# Patient Record
Sex: FEMALE | Race: WHITE | Employment: OTHER | URBAN - METROPOLITAN AREA
[De-identification: names, ages, dates, MRNs, and addresses within clinical notes are randomized per-mention and may not be internally consistent; named-entity substitution may affect disease eponyms.]

---

## 2017-08-10 LAB — AMB DEXA, EXTERNAL: NORMAL

## 2017-11-02 ENCOUNTER — OFFICE VISIT (OUTPATIENT)
Dept: FAMILY MEDICINE CLINIC | Age: 82
End: 2017-11-02

## 2017-11-02 VITALS
RESPIRATION RATE: 18 BRPM | SYSTOLIC BLOOD PRESSURE: 135 MMHG | WEIGHT: 114.8 LBS | BODY MASS INDEX: 21.67 KG/M2 | HEIGHT: 61 IN | HEART RATE: 74 BPM | DIASTOLIC BLOOD PRESSURE: 55 MMHG | TEMPERATURE: 97.7 F

## 2017-11-02 DIAGNOSIS — Z13.5 SCREENING FOR GLAUCOMA: ICD-10-CM

## 2017-11-02 DIAGNOSIS — R63.4 WEIGHT LOSS: Primary | ICD-10-CM

## 2017-11-02 PROBLEM — M81.0 AGE-RELATED OSTEOPOROSIS WITHOUT CURRENT PATHOLOGICAL FRACTURE: Status: ACTIVE | Noted: 2017-11-02

## 2017-11-02 NOTE — PROGRESS NOTES
Subjective:  Sonja Correia is 80y.o. year old female who presents today for establishment of care. Her daughter, who is with her, is concerned because she has lost weight. Patient Active Problem List   Diagnosis Code    Osteoarthritis M19.90    Age-related osteoporosis without current pathological fracture M81.0     Past Medical History:   Diagnosis Date    Joint pain     Osteoarthritis     Osteoporosis      History reviewed. No pertinent surgical history. Family History   Problem Relation Age of Onset    Hypertension Mother     Stroke Father     Lung Disease Brother     Cancer Brother      lung    Crohn's Disease Daughter     Thyroid Disease Daughter     Diabetes Son      Social History   Substance Use Topics    Smoking status: Never Smoker    Smokeless tobacco: Never Used    Alcohol use No     Allergies   Allergen Reactions    Codeine Nausea Only     Current Outpatient Prescriptions   Medication Sig Dispense Refill    denosumab (PROLIA) 60 mg/mL injection 60 mg by SubCUTAneous route. Objective:  Visit Vitals    /55    Pulse 74    Temp 97.7 °F (36.5 °C) (Oral)    Resp 18    Ht 5' 0.53\" (1.537 m)    Wt 114 lb 12.8 oz (52.1 kg)    BMI 22.03 kg/m2     General appearance - alert, well appearing, and in no distress  Mental status - alert, oriented to person, place, and time, normal mood, behavior, speech, dress, motor activity, and thought processes  Chest - clear to auscultation, no wheezes, rales or rhonchi, symmetric air entry   Heart - normal rate, regular rhythm, normal S1, S2, no murmurs, rubs, clicks or gallops   Ext-no pedal edema noted      Assessment/Plan:    ICD-10-CM ICD-9-CM    1. Weight loss R63.4 783.21    2. Screening for glaucoma Z13.5 V80.1 REFERRAL TO OPHTHALMOLOGY       Will follow weight  Eye exam    Follow-up Disposition:  Return in about 6 weeks (around 12/14/2017) for weight loss. Reviewed plan of care.   Patient has provided input and agrees with goals.

## 2017-11-02 NOTE — MR AVS SNAPSHOT
Visit Information Date & Time Provider Department Dept. Phone Encounter #  
 11/2/2017 10:00 AM Oscar Mckinnonloraine 34 [id number] Upcoming Health Maintenance Date Due DTaP/Tdap/Td series (1 - Tdap) 9/16/1952 ZOSTER VACCINE AGE 60> 7/16/1991 GLAUCOMA SCREENING Q2Y 9/16/1996 MEDICARE YEARLY EXAM 9/16/1996 Pneumococcal 65+ Low/Medium Risk (2 of 2 - PPSV23) 3/17/2017 Allergies as of 11/2/2017  Review Complete On: 11/2/2017 By: Freddy Moraes MD  
  
 Severity Noted Reaction Type Reactions Codeine  11/02/2017    Nausea Only Current Immunizations  Reviewed on 11/2/2017 Name Date DTaP 5/15/2012 Influenza Vaccine 10/15/2017 Reviewed by Flaquito Reyes LPN on 62/0/4113 at 24:88 AM  
You Were Diagnosed With   
  
 Codes Comments Weight loss    -  Primary ICD-10-CM: R63.4 ICD-9-CM: 783.21 Screening for glaucoma     ICD-10-CM: Z13.5 ICD-9-CM: V80.1 Vitals BP Pulse Temp Resp Height(growth percentile) Weight(growth percentile) 135/55 74 97.7 °F (36.5 °C) (Oral) 18 5' 0.53\" (1.537 m) 114 lb 12.8 oz (52.1 kg) BMI OB Status Smoking Status 22.03 kg/m2 Postmenopausal Never Smoker BMI and BSA Data Body Mass Index Body Surface Area 22.03 kg/m 2 1.49 m 2 Your Updated Medication List  
  
   
This list is accurate as of: 11/2/17 11:05 AM.  Always use your most recent med list.  
  
  
  
  
 PROLIA 60 mg/mL injection Generic drug:  denosumab 60 mg by SubCUTAneous route. We Performed the Following REFERRAL TO OPHTHALMOLOGY [REF57 Custom] Comments:  
 Screening for glaucoma Referral Information Referral ID Referred By Referred To  
  
 [id number] Erin Del Rio [facility] [address] [place], [address] Visits Status Start Date End Date 1 New Request 11/2/17 11/2/18 If your referral has a status of pending review or denied, additional information will be sent to support the outcome of this decision. Introducing [facility]! [facility] introduces [employer] patient portal. Now you can access parts of your medical record, email your doctor's office, and request medication refills online. 1. In your internet browser, go to [email or web address]://[email or web address]. [employer] 2. Click on the First Time User? Click Here link in the Sign In box. You will see the New Member Sign Up page. 3. Enter your [employer] Access Code exactly as it appears below. You will not need to use this code after youve completed the sign-up process. If you do not sign up before the expiration date, you must request a new code. · [employer] Access Code: [email or web address] Expires: 1/31/2018 10:05 AM 
 
4. Enter the last four digits of your Social Security Number (xxxx) and Date of Birth (mm/dd/yyyy) as indicated and click Submit. You will be taken to the next sign-up page. 5. Create a [employer] ID. This will be your [employer] login ID and cannot be changed, so think of one that is secure and easy to remember. 6. Create a [employer] password. You can change your password at any time. 7. Enter your Password Reset Question and Answer. This can be used at a later time if you forget your password. 8. Enter your e-mail address. You will receive e-mail notification when new information is available in [address] 9. Click Sign Up. You can now view and download portions of your medical record. 10. Click the Download Summary menu link to download a portable copy of your medical information. If you have questions, please visit the Frequently Asked Questions section of the [employer] website. Remember, [employer] is NOT to be used for urgent needs. For medical emergencies, dial 911. Now available from your iPhone and Android! Please provide this summary of care documentation to your next provider. Your primary care clinician is listed as Barney Archibald. If you have any questions after today's visit, please call [phone].

## 2017-12-14 ENCOUNTER — TELEPHONE (OUTPATIENT)
Dept: FAMILY MEDICINE CLINIC | Age: 82
End: 2017-12-14

## 2017-12-14 ENCOUNTER — OFFICE VISIT (OUTPATIENT)
Dept: FAMILY MEDICINE CLINIC | Age: 82
End: 2017-12-14

## 2017-12-14 VITALS
HEIGHT: 60 IN | SYSTOLIC BLOOD PRESSURE: 105 MMHG | HEART RATE: 87 BPM | BODY MASS INDEX: 22.58 KG/M2 | WEIGHT: 115 LBS | DIASTOLIC BLOOD PRESSURE: 56 MMHG | RESPIRATION RATE: 18 BRPM | TEMPERATURE: 97.6 F

## 2017-12-14 DIAGNOSIS — Z13.220 SCREENING CHOLESTEROL LEVEL: ICD-10-CM

## 2017-12-14 DIAGNOSIS — R63.4 UNEXPLAINED WEIGHT LOSS: Primary | ICD-10-CM

## 2017-12-14 NOTE — MR AVS SNAPSHOT
Visit Information Date & Time Provider Department Dept. Phone Encounter #  
 12/14/2017  9:45 AM Rachael McdonaldVesta 34 [id number] Follow-up Instructions Return in about 3 months (around 3/14/2018) for Medicare wellness visit. Your Appointments 3/22/2018  8:15 AM  
Medicare Physical with Rachael Mcdonald MD  
[address] [facility]) Appt Note: Medicare Wellness [address] [address]  
  
   
 [address] [place] [address] [address] [address] Upcoming Health Maintenance Date Due ZOSTER VACCINE AGE 60> 7/16/1991 GLAUCOMA SCREENING Q2Y 9/16/1996 MEDICARE YEARLY EXAM 9/16/1996 Pneumococcal 65+ Low/Medium Risk (2 of 2 - PPSV23) 3/17/2017 DTaP/Tdap/Td series (2 - Td) 5/15/2022 Allergies as of 12/14/2017  Review Complete On: 12/14/2017 By: Rachael Mcdonald MD  
  
 Severity Noted Reaction Type Reactions Codeine  11/02/2017    Nausea Only Current Immunizations  Reviewed on 11/2/2017 Name Date DTaP 5/15/2012 Influenza Vaccine 10/15/2017 Not reviewed this visit You Were Diagnosed With   
  
 Codes Comments Unexplained weight loss    -  Primary ICD-10-CM: R63.4 ICD-9-CM: 783.21 Screening cholesterol level     ICD-10-CM: C60.232 ICD-9-CM: V77.91 Vitals BP Pulse Temp Resp Height(growth percentile) Weight(growth percentile) 105/56 87 97.6 °F (36.4 °C) (Oral) 18 5' (1.524 m) 115 lb (52.2 kg) BMI OB Status Smoking Status 22.46 kg/m2 Postmenopausal Never Smoker Vitals History BMI and BSA Data Body Mass Index Body Surface Area  
 22.46 kg/m 2 1.49 m 2 Preferred Pharmacy Pharmacy Name Phone [id number] [place], [address] AT [address] [phone] Your Updated Medication List  
  
   
 This list is accurate as of: 12/14/17  1:15 PM.  Always use your most recent med list.  
  
  
  
  
 CALCIUM 600 + D 600-125 mg-unit Tab Generic drug:  calcium-cholecalciferol (d3) Take  by mouth. PROLIA 60 mg/mL injection Generic drug:  denosumab 60 mg by SubCUTAneous route. We Performed the Following CBC WITH AUTOMATED DIFF [71336 CPT(R)] LIPID PANEL [78036 CPT(R)] METABOLIC PANEL, COMPREHENSIVE [45335 CPT(R)] TSH 3RD GENERATION [17777 CPT(R)] Follow-up Instructions Return in about 3 months (around 3/14/2018) for Medicare wellness visit. Introducing [facility] HE[facility] SERVICES! Romayne Duster introduces [employer] patient portal. Now you can access parts of your medical record, email your doctor's office, and request medication refills online. 1. In your internet browser, go to [email or web address] 2. Click on the First Time User? Click Here link in the Sign In box. You will see the New Member Sign Up page. 3. Enter your [employer] Access Code exactly as it appears below. You will not need to use this code after youve completed the sign-up process. If you do not sign up before the expiration date, you must request a new code. · [employer] Access Code: [email or web address] Expires: 1/31/2018  9:05 AM 
 
4. Enter the last four digits of your Social Security Number (xxxx) and Date of Birth (mm/dd/yyyy) as indicated and click Submit. You will be taken to the next sign-up page. 5. Create a [employer] ID. This will be your [employer] login ID and cannot be changed, so think of one that is secure and easy to remember. 6. Create a [employer] password. You can change your password at any time. 7. Enter your Password Reset Question and Answer. This can be used at a later time if you forget your password. 8. Enter your e-mail address. You will receive e-mail notification when new information is available in [address] 9. Click Sign Up. You can now view and download portions of your medical record. 10. Click the Download Summary menu link to download a portable copy of your medical information. If you have questions, please visit the Frequently Asked Questions section of the [employer] website. Remember, [employer] is NOT to be used for urgent needs. For medical emergencies, dial 911. Now available from your iPhone and Android! Please provide this summary of care documentation to your next provider. Your primary care clinician is listed as Naima Baca. If you have any questions after today's visit, please call [phone].

## 2017-12-14 NOTE — PROGRESS NOTES
HISTORY OF PRESENT ILLNESS  Shabnam Hernandez is a 80 y.o. female. Weight Loss   The history is provided by the patient (her daughter is with her today. Her weight has stabilzed. She has been eating more ice cream.). This is a chronic problem. Episode onset: about 1 1/2 years ago. The problem has been resolved. Pertinent negatives include no chest pain, no abdominal pain, no headaches and no shortness of breath. Nothing aggravates the symptoms. Relieved by: increasing caloric intake. She has tried nothing for the symptoms. Review of Systems   Constitutional: Negative for chills, fever, malaise/fatigue and weight loss. Eyes: Negative for blurred vision. Respiratory: Negative for cough, shortness of breath and wheezing. Cardiovascular: Negative for chest pain and leg swelling. Gastrointestinal: Negative for abdominal pain, blood in stool, constipation, diarrhea, heartburn, melena, nausea and vomiting. Genitourinary: Negative for hematuria. No polyuria   Musculoskeletal:        Right hip pain due to osteoarthritis     Skin: Negative for rash. Neurological: Negative for headaches. Endo/Heme/Allergies: Negative for polydipsia. Psychiatric/Behavioral: Negative for depression. She enjoys life       Visit Vitals    /56    Pulse 87    Temp 97.6 °F (36.4 °C) (Oral)    Resp 18    Ht 5' (1.524 m)    Wt 115 lb (52.2 kg)    BMI 22.46 kg/m2     BP Readings from Last 3 Encounters:   12/14/17 105/56   11/02/17 135/55     Physical Exam   Constitutional: She is oriented to person, place, and time. She appears well-developed and well-nourished. No distress. Cardiovascular: Normal rate, regular rhythm and normal heart sounds. Exam reveals no gallop and no friction rub. No murmur heard. Pulmonary/Chest: Effort normal and breath sounds normal. No respiratory distress. She has no wheezes. She has no rales. Abdominal: Soft.  Normal appearance and bowel sounds are normal. She exhibits no distension. There is no hepatosplenomegaly. There is no tenderness. There is no rebound and no guarding. Musculoskeletal: She exhibits no edema. Neurological: She is alert and oriented to person, place, and time. Skin: Skin is warm and dry. She is not diaphoretic. Psychiatric: She has a normal mood and affect. Her behavior is normal. Judgment and thought content normal.   Nursing note and vitals reviewed. ASSESSMENT and PLAN    ICD-10-CM ICD-9-CM    1. Unexplained weight loss T45.9 172.77 METABOLIC PANEL, COMPREHENSIVE      CBC WITH AUTOMATED DIFF      TSH 3RD GENERATION   2. Screening cholesterol level Z13.220 V77.91 LIPID PANEL        Weight has stabilized  Labs per orders. Follow-up Disposition:  Return in about 3 months (around 3/14/2018) for Medicare wellness visit. Reviewed plan of care. Patient has provided input and agrees with goals.

## 2017-12-14 NOTE — TELEPHONE ENCOUNTER
----- Message from Caridad Griffin MD sent at 12/14/2017 10:38 AM EST -----  Please get her bone density result from [facility]. Please leave it on my chair. Call the patient and her daughter when it is received.

## 2017-12-14 NOTE — PROGRESS NOTES
Chief Complaint   Patient presents with    Weight Loss     6 wk f/u and discuss bone density report     1. Have you been to the ER, urgent care clinic since your last visit? No  Hospitalized since your last visit? No    2. Have you seen or consulted any other health care providers outside of the [address] [place] [address] since your last visit? Include any pap smears or colon screening.  No

## 2017-12-14 NOTE — TELEPHONE ENCOUNTER
----- Message from Aminta Delgado sent at 12/14/2017 12:21 PM EST -----  Regarding: Dr. Krunal Wong, daughter, is wonder if paperwork will be mailed to her for the Medicare Wellness Visit in March as it was not given to her while at the practice this morning    Best contact  [phone]

## 2017-12-14 NOTE — TELEPHONE ENCOUNTER
Pt's daughter requested call back to advise when pt's records from Dr. Marcia Villalobos are located, especially pt's bone density results from 8/10/17, stating release was signed and she confirmed with office records were already sent to Dr. Hortencia Parnell. Nurse located bone density report in [facility] and Dr. Hortencia Parnell can review. Pt's daughter advised and can be reached at [phone] if anything further is needed after results are reviewed.  Fei

## 2017-12-15 ENCOUNTER — TELEPHONE (OUTPATIENT)
Dept: FAMILY MEDICINE CLINIC | Age: 82
End: 2017-12-15

## 2017-12-15 NOTE — TELEPHONE ENCOUNTER
Called pt's daughter and left a voice message advising the following. Advised I was not sure what type of paperwork she was needing in regard to her AWV. Advised patients, normally, receive an after visit summary at the end of her appointment and that has pt's appointment summary and any additional information provider recommends for pt. Advised Dr. Alejandrina Palomares has not, yet, reviewed pt's records however she does normally review over the weekend. Advised we will be faxing over request, for Prolia  injection, today, and should receive a call today or he latest Monday to schedule. Advised to call back with questions or concerns.

## 2017-12-15 NOTE — TELEPHONE ENCOUNTER
Pts daughter, Deshawn Ponce returning office call to discuss paperwork for Medicare Wellness visit in March. She is also asking for Dr Cele Madrigal to review her records and order her next Prolia injection which is now due. She can be reached at [phone] but she is at work and said that you can leave a message concerning this.

## 2017-12-15 NOTE — TELEPHONE ENCOUNTER
Called pt's daughter, and left a voice message, asking that she call the office back in regard to her question/concern.

## 2017-12-26 ENCOUNTER — TELEPHONE (OUTPATIENT)
Dept: FAMILY MEDICINE CLINIC | Age: 82
End: 2017-12-26

## 2017-12-26 NOTE — PROGRESS NOTES
Called Dr Emre Casas office ([phone]) and spoke with Dr Omer Tolentino, re: for order for Prolia shot. As per Dr Kai Cazares order was already faxed over. Order was not in  media. [address] [place] [address] with Meena Osorio who confirmed order for medication, and indicated that order will be scanned in media today.

## 2017-12-26 NOTE — TELEPHONE ENCOUNTER
Phone call with Ms. Jc Timmons NP at the infusion center. Evidently the patient is coming in tomorrow for Prolia, but they have no order. Advised her that it was sent 12/15 and that Jonnie Saba said they had received it. She will check with La and get back with me if a new order is needed.

## 2017-12-27 ENCOUNTER — HOSPITAL ENCOUNTER (OUTPATIENT)
Dept: INFUSION THERAPY | Age: 82
Discharge: HOME | End: 2017-12-27
Payer: MEDICARE

## 2017-12-27 VITALS
TEMPERATURE: 97 F | HEART RATE: 69 BPM | RESPIRATION RATE: 18 BRPM | SYSTOLIC BLOOD PRESSURE: 130 MMHG | DIASTOLIC BLOOD PRESSURE: 61 MMHG

## 2017-12-27 LAB
ANION GAP BLD CALC-SCNC: 15 MMOL/L (ref 5–15)
BUN BLD-MCNC: 18 MG/DL (ref 9–20)
CA-I BLD-MCNC: 1.16 MMOL/L (ref 1.12–1.32)
CHLORIDE BLD-SCNC: 103 MMOL/L (ref 98–107)
CO2 BLD-SCNC: 28 MMOL/L (ref 21–32)
CREAT BLD-MCNC: 0.7 MG/DL (ref 0.6–1.3)
GLUCOSE BLD-MCNC: 151 MG/DL (ref 65–100)
HCT VFR BLD CALC: 35 % (ref 35–47)
HGB BLD-MCNC: 11.9 GM/DL (ref 11.5–16)
MAGNESIUM SERPL-MCNC: 2.1 MG/DL (ref 1.6–2.4)
PHOSPHATE SERPL-MCNC: 3.1 MG/DL (ref 2.6–4.7)
POTASSIUM BLD-SCNC: 3.9 MMOL/L (ref 3.5–5.1)
SERVICE CMNT-IMP: ABNORMAL
SODIUM BLD-SCNC: 141 MMOL/L (ref 136–145)

## 2017-12-27 PROCEDURE — 74011250636 HC RX REV CODE- 250/636: Performed by: FAMILY MEDICINE

## 2017-12-27 PROCEDURE — 80047 BASIC METABLC PNL IONIZED CA: CPT

## 2017-12-27 PROCEDURE — 84100 ASSAY OF PHOSPHORUS: CPT | Performed by: FAMILY MEDICINE

## 2017-12-27 PROCEDURE — 36415 COLL VENOUS BLD VENIPUNCTURE: CPT | Performed by: FAMILY MEDICINE

## 2017-12-27 PROCEDURE — 96372 THER/PROPH/DIAG INJ SC/IM: CPT

## 2017-12-27 PROCEDURE — 83735 ASSAY OF MAGNESIUM: CPT | Performed by: FAMILY MEDICINE

## 2017-12-27 RX ADMIN — DENOSUMAB 60 MG: 60 INJECTION SUBCUTANEOUS at 15:20

## 2017-12-27 NOTE — PROGRESS NOTES
Outpatient [facility]fusion [facility] Nursing Progress Note:    0076  Patient arrived ambulatory, accompanied by family, for scheduled Prolia injection. PT has had this previously and understands what to expect. I reinforced the need to take calcium and vitamin D to prevent hypocalcemia and to avoid invasive dental procedures/rationale. Reminded to report to physician any new dull or aching hip or thigh pain, incapacitating or severe bone, joint, and/or muscle pain, or persistent jaw or mouth pain and/or a nonhealing mouth or jaw sore. Creatinine 0.7 and icalcium 1.16 . Blood pressure 130/61, pulse 69, temperature 97 °F (36.1 °C), resp. rate 18. Pt tolerated injection well; has next prolia appointment 6/27/17 @ 1400.    1525 pt left ambulatory,  in no distress.

## 2017-12-27 NOTE — PROGRESS NOTES
Problem: Knowledge Deficit  Goal: *Verbalizes understanding of procedures and medications  Outcome: Progressing Towards Goal  PT arrived ambulatory and in no distress for Prolia.

## 2018-01-06 LAB
ALBUMIN SERPL-MCNC: 4.3 G/DL (ref 3.5–4.7)
ALBUMIN/GLOB SERPL: 1.8 {RATIO} (ref 1.2–2.2)
ALP SERPL-CCNC: 48 IU/L (ref 39–117)
ALT SERPL-CCNC: 7 IU/L (ref 0–32)
AST SERPL-CCNC: 15 IU/L (ref 0–40)
BASOPHILS # BLD AUTO: 0 X10E3/UL (ref 0–0.2)
BASOPHILS NFR BLD AUTO: 0 %
BILIRUB SERPL-MCNC: 0.5 MG/DL (ref 0–1.2)
BUN SERPL-MCNC: 15 MG/DL (ref 8–27)
BUN/CREAT SERPL: 19 (ref 12–28)
CALCIUM SERPL-MCNC: 8.9 MG/DL (ref 8.7–10.3)
CHLORIDE SERPL-SCNC: 104 MMOL/L (ref 96–106)
CHOLEST SERPL-MCNC: 242 MG/DL (ref 100–199)
CO2 SERPL-SCNC: 25 MMOL/L (ref 18–29)
CREAT SERPL-MCNC: 0.81 MG/DL (ref 0.57–1)
EOSINOPHIL # BLD AUTO: 0.1 X10E3/UL (ref 0–0.4)
EOSINOPHIL NFR BLD AUTO: 1 %
ERYTHROCYTE [DISTWIDTH] IN BLOOD BY AUTOMATED COUNT: 13.9 % (ref 12.3–15.4)
GLOBULIN SER CALC-MCNC: 2.4 G/DL (ref 1.5–4.5)
GLUCOSE SERPL-MCNC: 96 MG/DL (ref 65–99)
HCT VFR BLD AUTO: 43.9 % (ref 34–46.6)
HDLC SERPL-MCNC: 49 MG/DL
HGB BLD-MCNC: 14.4 G/DL (ref 11.1–15.9)
IMM GRANULOCYTES # BLD: 0 X10E3/UL (ref 0–0.1)
IMM GRANULOCYTES NFR BLD: 0 %
INTERPRETATION, 910389: NORMAL
LDLC SERPL CALC-MCNC: 161 MG/DL (ref 0–99)
LYMPHOCYTES # BLD AUTO: 1.5 X10E3/UL (ref 0.7–3.1)
LYMPHOCYTES NFR BLD AUTO: 30 %
MCH RBC QN AUTO: 30.8 PG (ref 26.6–33)
MCHC RBC AUTO-ENTMCNC: 32.8 G/DL (ref 31.5–35.7)
MCV RBC AUTO: 94 FL (ref 79–97)
MONOCYTES # BLD AUTO: 0.3 X10E3/UL (ref 0.1–0.9)
MONOCYTES NFR BLD AUTO: 6 %
NEUTROPHILS # BLD AUTO: 3.2 X10E3/UL (ref 1.4–7)
NEUTROPHILS NFR BLD AUTO: 63 %
PLATELET # BLD AUTO: 226 X10E3/UL (ref 150–379)
POTASSIUM SERPL-SCNC: 4.5 MMOL/L (ref 3.5–5.2)
PROT SERPL-MCNC: 6.7 G/DL (ref 6–8.5)
RBC # BLD AUTO: 4.68 X10E6/UL (ref 3.77–5.28)
SODIUM SERPL-SCNC: 146 MMOL/L (ref 134–144)
TRIGL SERPL-MCNC: 160 MG/DL (ref 0–149)
TSH SERPL DL<=0.005 MIU/L-ACNC: 7.62 UIU/ML (ref 0.45–4.5)
VLDLC SERPL CALC-MCNC: 32 MG/DL (ref 5–40)
WBC # BLD AUTO: 5 X10E3/UL (ref 3.4–10.8)

## 2018-01-07 DIAGNOSIS — R73.9 ELEVATED BLOOD SUGAR: Primary | ICD-10-CM

## 2018-01-24 LAB
EST. AVERAGE GLUCOSE BLD GHB EST-MCNC: 105 MG/DL
HBA1C MFR BLD: 5.3 % (ref 4.8–5.6)

## 2018-03-22 ENCOUNTER — OFFICE VISIT (OUTPATIENT)
Dept: FAMILY MEDICINE CLINIC | Age: 83
End: 2018-03-22

## 2018-03-22 VITALS
HEART RATE: 78 BPM | HEIGHT: 60 IN | DIASTOLIC BLOOD PRESSURE: 50 MMHG | TEMPERATURE: 97.5 F | BODY MASS INDEX: 23.36 KG/M2 | WEIGHT: 119 LBS | SYSTOLIC BLOOD PRESSURE: 137 MMHG | RESPIRATION RATE: 20 BRPM

## 2018-03-22 DIAGNOSIS — Z71.89 ACP (ADVANCE CARE PLANNING): ICD-10-CM

## 2018-03-22 DIAGNOSIS — Z13.31 SCREENING FOR DEPRESSION: ICD-10-CM

## 2018-03-22 DIAGNOSIS — Z00.00 MEDICARE ANNUAL WELLNESS VISIT, SUBSEQUENT: Primary | ICD-10-CM

## 2018-03-22 NOTE — PATIENT INSTRUCTIONS

## 2018-03-22 NOTE — PROGRESS NOTES
Chief Complaint   Patient presents with   [address] Maintenance   Topic Date Due    ZOSTER VACCINE AGE 60>  07/16/1991    GLAUCOMA SCREENING Q2Y  09/16/1996    Pneumococcal 65+ Low/Medium Risk (2 of 2 - PPSV23) 03/17/2017    MEDICARE YEARLY EXAM  03/20/2018    DTaP/Tdap/Td series (2 - Tdap) 05/15/2022    Bone Densitometry (Dexa) Screening  Completed    Influenza Age 5 to Adult  Completed

## 2018-03-22 NOTE — PROGRESS NOTES
This is the Subsequent Medicare Annual Wellness Exam, performed 12 months or more after the Initial AWV or the last Subsequent AWV    I have reviewed the patient's medical history in detail and updated the computerized patient record. History     Past Medical History:   Diagnosis Date    ACP (advance care planning) 3/22/2018    The patient brought in her signed advance directive today (3/22/18).  Joint pain     Osteoarthritis     Osteoporosis     Schatzki's ring       History reviewed. No pertinent surgical history. Current Outpatient Prescriptions   Medication Sig Dispense Refill    calcium-cholecalciferol, d3, (CALCIUM 600 + D) 600-125 mg-unit tab Take  by mouth.  denosumab (PROLIA) 60 mg/mL injection 60 mg by SubCUTAneous route. Allergies   Allergen Reactions    Codeine Nausea Only     Family History   Problem Relation Age of Onset    Hypertension Mother     Stroke Father     Lung Disease Brother     Cancer Brother      lung    Crohn's Disease Daughter     Thyroid Disease Daughter     Diabetes Son      Social History   Substance Use Topics    Smoking status: Never Smoker    Smokeless tobacco: Never Used    Alcohol use No     Patient Active Problem List   Diagnosis Code    Osteoarthritis M19.90    Age-related osteoporosis without current pathological fracture M81.0    ACP (advance care planning) Z71.89    Schatzki's ring K22.2       Depression Risk Factor Screening:     PHQ over the last two weeks 3/22/2018   Little interest or pleasure in doing things Not at all   Feeling down, depressed or hopeless Not at all   Total Score PHQ 2 0     Alcohol Risk Factor Screening: You do not drink alcohol or very rarely. Functional Ability and Level of Safety:   Hearing Loss  Hearing is good. Activities of Daily Living  The home contains: no safety equipment. Patient does total self care    Fall Risk  Fall Risk Assessment, last 12 mths 3/22/2018   Able to walk?  Yes   Fall in past 12 months? No       Abuse Screen  Patient is not abused    Cognitive Screening   Evaluation of Cognitive Function:  Has your family/caregiver stated any concerns about your memory: no  Normal, Clock Drawing test    Patient Care Team   Patient Care Team:  Connie Frankel MD as PCP - General (Family Practice)      Visit Vitals    /50    Pulse 78    Temp 97.5 °F (36.4 °C) (Oral)    Resp 20    Ht 5' (1.524 m)    Wt 119 lb (54 kg)    BMI 23.24 kg/m2     General appearance - alert, well appearing, and in no distress  Chest - clear to auscultation, no wheezes, rales or rhonchi, symmetric air entry   Heart - normal rate, regular rhythm, normal S1, S2, no murmurs, rubs, clicks or gallops   Ext-no pedal edema noted      Assessment/Plan   Education and counseling provided:  Are appropriate based on today's review and evaluation  The patient brought in her signed advance directive today. Advance Care Plan or Surrogate Decision Maker documented in medical record. Diagnoses and all orders for this visit:    1. Medicare annual wellness visit, subsequent    2. Screening for depression  -     Depression Screen Annual    3. ACP (advance care planning)        Health Maintenance Due   Topic Date Due    ZOSTER VACCINE AGE 60>  07/16/1991    GLAUCOMA SCREENING Q2Y  09/16/1996    Pneumococcal 65+ Low/Medium Risk (2 of 2 - PPSV23) 03/17/2017    MEDICARE YEARLY EXAM  03/20/2018     Patient has had Zoster, pneumonia x2 and an eye exam.      Follow-up Disposition:  Return in about 1 year (around 3/22/2019) for annual wellness visit. Reviewed plan of care. Patient has provided input and agrees with goals.

## 2018-03-22 NOTE — MR AVS SNAPSHOT
[address] 
[phone] Patient: Viraj Fernandes MRN: [id number] ND Visit Information Date & Time Provider Department Dept. Phone Encounter #  
 3/22/2018  8:15 AM Alex Moser MD Formerly [facility] 34 [id number] Upcoming Health Maintenance Date Due ZOSTER VACCINE AGE 60> 1991 GLAUCOMA SCREENING Q2Y 1996 Pneumococcal 65+ Low/Medium Risk (2 of 2 - PPSV23) 3/17/2017 MEDICARE YEARLY EXAM 3/20/2018 DTaP/Tdap/Td series (2 - Tdap) 5/15/2022 Allergies as of 3/22/2018  Review Complete On: 3/22/2018 By: Alex Moser MD  
  
 Severity Noted Reaction Type Reactions Codeine  2017    Nausea Only Current Immunizations  Reviewed on 2017 Name Date DTaP 5/15/2012 Influenza Vaccine 10/15/2017 Not reviewed this visit You Were Diagnosed With   
  
 Codes Comments Medicare annual wellness visit, subsequent    -  Primary ICD-10-CM: Z00.00 ICD-9-CM: V70.0 Screening for depression     ICD-10-CM: Z13.89 ICD-9-CM: V79.0 ACP (advance care planning)     ICD-10-CM: Z71.89 ICD-9-CM: V65.49 Vitals BP Pulse Temp Resp Height(growth percentile) Weight(growth percentile) 137/50 78 97.5 °F (36.4 °C) (Oral) 20 5' (1.524 m) 119 lb (54 kg) BMI OB Status Smoking Status 23.24 kg/m2 Postmenopausal Never Smoker Vitals History BMI and BSA Data Body Mass Index Body Surface Area  
 23.24 kg/m 2 1.51 m 2 Preferred Pharmacy Pharmacy Name Phone [id number] [address] [facility] [phone] Your Updated Medication List  
  
   
This list is accurate as of 3/22/18  8:50 AM.  Always use your most recent med list.  
  
  
  
  
 CALCIUM 600 + D 600-125 mg-unit Tab Generic drug:  calcium-cholecalciferol (d3) Take  by mouth. PROLIA 60 mg/mL injection Generic drug:  denosumab 60 mg by SubCUTAneous route. We Performed the Following Estrellita 68 [[id number] \[facility] [place] [facility]\""] To-Do List   
 06/27/2018 2:00 PM  
  Appointment with Jesus Lino at [place] [address] ([phone])  
  
 12/26/2018 2:00 PM  
  Appointment with Jesus Hightower Zoie at [place] [address] ([phone]) Patient Instructions Medicare Wellness Visit, Female The best way to live healthy is to have a healthy lifestyle by eating a well-balanced diet, exercising regularly, limiting alcohol and stopping smoking. Regular physical exams and screening tests are another way to keep healthy. Preventive exams provided by your health care provider can find health problems before they become diseases or illnesses. Preventive services including immunizations, screening tests, monitoring and exams can help you take care of your own health. All people over age 72 should have a pneumovax  and and a prevnar shot to prevent pneumonia. These are once in a lifetime unless you and your provider decide differently. All people over 65 should have a yearly flu shot and a tetanus vaccine every 10 years. A bone mass density to screen for osteoporosis or thinning of the bones should be done every 2 years after 65. Screening for diabetes mellitus with a blood sugar test should be done every year. Glaucoma is a disease of the eye due to increased ocular pressure that can lead to blindness and it should be done every year by an eye professional. 
 
Cardiovascular screening tests that check for elevated lipids (fatty part of blood) which can lead to heart disease and strokes should be done every 5 years. Colorectal screening that evaluates for blood or polyps in your colon should be done yearly as a stool test or every five years as a flexible sigmoidoscope or every 10 years as a colonoscopy up to age 76. Breast cancer screening with a mammogram is recommended biennially  for women age 54-69. Screening for cervical cancer with a pap smear and pelvic exam is recommended for women after age 72 years every 2 years up to age 79 or when the provider and patient decide to stop. If there is a history of cervical abnormalities or other increased risk for cancer then the test is recommended yearly. Hepatitis C screening is also recommended for anyone born between 80 through [address]. A shingles vaccine is also recommended once in a lifetime after age 61. Your Medicare Wellness Exam is recommended annually. Here is a list of your current Health Maintenance items with a due date: 
Health Maintenance Due Topic Date Due  Shingles Vaccine  07/16/1991  Glaucoma Screening   09/16/1996  Pneumococcal Vaccine (2 of 2 - PPSV23) 03/17/2017 [facility] Annual Well Visit  03/20/2018 Introducing [address]! [facility] introduces BrightRoll patient portal. Now you can access parts of your medical record, email your doctor's office, and request medication refills online. 1. In your internet browser, go to [email or web address] 2. Click on the First Time User? Click Here link in the Sign In box. You will see the New Member Sign Up page. 3. Enter your BrightRoll Access Code exactly as it appears below. You will not need to use this code after youve completed the sign-up process. If you do not sign up before the expiration date, you must request a new code. · BrightRoll Access Code: [email or web address] Expires: 5/14/2018 12:28 PM 
 
4. Enter the last four digits of your Social Security Number (xxxx) and Date of Birth (mm/dd/yyyy) as indicated and click Submit. You will be taken to the next sign-up page. 5. Create a BrightRoll ID. This will be your BrightRoll login ID and cannot be changed, so think of one that is secure and easy to remember. 6. Create a InSupply password. You can change your password at any time. 7. Enter your Password Reset Question and Answer. This can be used at a later time if you forget your password. 8. Enter your e-mail address. You will receive e-mail notification when new information is available in [address] 9. Click Sign Up. You can now view and download portions of your medical record. 10. Click the Download Summary menu link to download a portable copy of your medical information. If you have questions, please visit the Frequently Asked Questions section of the InSupply website. Remember, InSupply is NOT to be used for urgent needs. For medical emergencies, dial 911. Now available from your iPhone and Android! Please provide this summary of care documentation to your next provider. Your primary care clinician is listed as Vijay Duron. If you have any questions after today's visit, please call [phone].

## 2018-04-06 ENCOUNTER — TELEPHONE (OUTPATIENT)
Dept: FAMILY MEDICINE CLINIC | Age: 83
End: 2018-04-06

## 2018-04-06 NOTE — TELEPHONE ENCOUNTER
Called & talked w/Paula at [facility] at [phone] and requested patient's recent Bone Density report please be faxed to our office, per order of Dr. Delfina Schwarz. Did receive this information via fax & it was put in Dr. Shiela Ahuja in basket for results which is located in her office.

## 2018-04-06 NOTE — TELEPHONE ENCOUNTER
Faxed to Dr. Kathie Villaseñor office request form, to please fax to our office patient's recent eye exam office visit notes, per order of Dr. Arti Briggs. Did receive confirmation that request form successfully was transmitted to his office.

## 2018-05-23 ENCOUNTER — OFFICE VISIT (OUTPATIENT)
Dept: FAMILY MEDICINE CLINIC | Age: 83
End: 2018-05-23

## 2018-05-23 VITALS
BODY MASS INDEX: 22.74 KG/M2 | WEIGHT: 115.8 LBS | HEART RATE: 83 BPM | SYSTOLIC BLOOD PRESSURE: 114 MMHG | RESPIRATION RATE: 16 BRPM | HEIGHT: 60 IN | OXYGEN SATURATION: 98 % | TEMPERATURE: 97.5 F | DIASTOLIC BLOOD PRESSURE: 54 MMHG

## 2018-05-23 DIAGNOSIS — K64.9 HEMORRHOIDS, UNSPECIFIED HEMORRHOID TYPE: ICD-10-CM

## 2018-05-23 DIAGNOSIS — K62.5 BRIGHT RED RECTAL BLEEDING: Primary | ICD-10-CM

## 2018-05-23 NOTE — MR AVS SNAPSHOT
[address] 
[phone] Patient: Raynell Dandy MRN: [id number] RNL:[birth year] Visit Information Date & Time Provider Department Dept. Phone Encounter #  
 5/23/2018 10:30 AM [facility] 34 [id number] Follow-up Instructions Return if symptoms worsen or fail to improve. Upcoming Health Maintenance Date Due ZOSTER VACCINE AGE 60> 7/16/1991 Pneumococcal 65+ Low/Medium Risk (2 of 2 - PPSV23) 3/17/2017 Influenza Age 5 to Adult 8/1/2018 MEDICARE YEARLY EXAM 3/23/2019 GLAUCOMA SCREENING Q2Y 2/21/2020 DTaP/Tdap/Td series (2 - Tdap) 5/15/2022 Allergies as of 5/23/2018  Review Complete On: 5/23/2018 By: Sveta Sommers MD  
  
 Severity Noted Reaction Type Reactions Codeine  11/02/2017    Nausea Only Current Immunizations  Reviewed on 12/27/2017 Name Date DTaP 5/15/2012 Influenza Vaccine 10/15/2017 Not reviewed this visit You Were Diagnosed With   
  
 Codes Comments Bright red rectal bleeding    -  Primary ICD-10-CM: K62.5 ICD-9-CM: 569.3 Hemorrhoids, unspecified hemorrhoid type     ICD-10-CM: K64.9 ICD-9-CM: 732. 6 Vitals BP Pulse Temp Resp Height(growth percentile) Weight(growth percentile) 114/54 (BP 1 Location: Left arm, BP Patient Position: Sitting) 83 97.5 °F (36.4 °C) (Oral) 16 5' (1.524 m) 115 lb 12.8 oz (52.5 kg) SpO2 BMI OB Status Smoking Status 98% 22.62 kg/m2 Postmenopausal Never Smoker Vitals History BMI and BSA Data Body Mass Index Body Surface Area  
 22.62 kg/m 2 1.49 m 2 Preferred Pharmacy Pharmacy Name Phone [id number]- [place], [address] AT [facility] [phone] Your Updated Medication List  
  
   
This list is accurate as of 5/23/18 11:52 AM.  Always use your most recent med list.  
  
  
 CALCIUM 600 + D 600-125 mg-unit Tab Generic drug:  calcium-cholecalciferol (d3) Take  by mouth. OTHER Take 2 Tabs by mouth daily. Indications: preservision areds PROLIA 60 mg/mL injection Generic drug:  denosumab 60 mg by SubCUTAneous route. We Performed the Following CBC WITH AUTOMATED DIFF [92227 CPT(R)] REFERRAL TO GASTROENTEROLOGY [XLG56 Custom] Comments:  
 Rectal bleeding Follow-up Instructions Return if symptoms worsen or fail to improve. To-Do List   
 06/28/2018 2:00 PM  
  Appointment with Teryl Anderson 1 at [place] [address] ([phone])  
  
 12/27/2018 2:00 PM  
  Appointment with Teryl Anderson 1 at [place] [address] ([phone]) Referral Information Referral ID Referred By Referred To  
  
 [id number] [place] [address]  [place], [address] [facility] Visits Status Start Date End Date 1 New Request 5/23/18 5/23/19 If your referral has a status of pending review or denied, additional information will be sent to support the outcome of this decision. Patient Instructions Hemorrhoids: Care Instructions Your Care Instructions Hemorrhoids are enlarged veins that develop in the anal canal. Bleeding during bowel movements, itching, swelling, and rectal pain are the most common symptoms. They can be uncomfortable at times, but hemorrhoids rarely are a serious problem. You can treat most hemorrhoids with simple changes to your diet and bowel habits. These changes include eating more fiber and not straining to pass stools. Most hemorrhoids do not need surgery or other treatment unless they are very large and painful or bleed a lot. Follow-up care is a key part of your treatment and safety.  Be sure to make and go to all appointments, and call your doctor if you are having problems. It's also a good idea to know your test results and keep a list of the medicines you take. How can you care for yourself at home? · Sit in a few inches of warm water (sitz bath) 3 times a day and after bowel movements. The warm water helps with pain and itching. · Put ice on your anal area several times a day for 10 minutes at a time. Put a thin cloth between the ice and your skin. Follow this by placing a warm, wet towel on the area for another 10 to 20 minutes. · Take pain medicines exactly as directed. ¨ If the doctor gave you a prescription medicine for pain, take it as prescribed. ¨ If you are not taking a prescription pain medicine, ask your doctor if you can take an over-the-counter medicine. · Keep the anal area clean, but be gentle. Use water and a fragrance-free soap, such as [place], or use baby wipes or medicated pads, such as Tucks. · Wear cotton underwear and loose clothing to decrease moisture in the anal area. · Eat more fiber. Include foods such as whole-grain breads and cereals, raw vegetables, raw and dried fruits, and beans. · Drink plenty of fluids, enough so that your urine is light yellow or clear like water. If you have kidney, heart, or liver disease and have to limit fluids, talk with your doctor before you increase the amount of fluids you drink. · Use a stool softener that contains bran or psyllium. You can save money by buying bran or psyllium (available in bulk at most health food stores) and sprinkling it on foods or stirring it into fruit juice. Or you can use a product such as Metamucil or Hydrocil. · Practice healthy bowel habits. ¨ Go to the bathroom as soon as you have the urge. ¨ Avoid straining to pass stools. Relax and give yourself time to let things happen naturally. ¨ Do not hold your breath while passing stools. ¨ Do not read while sitting on the toilet. Get off the toilet as soon as you have finished. · Take your medicines exactly as prescribed. Call your doctor if you think you are having a problem with your medicine. When should you call for help? Call 911 anytime you think you may need emergency care. For example, call if: 
? · You pass maroon or very bloody stools. ?Call your doctor now or seek immediate medical care if: 
? · You have increased pain. ? · You have increased bleeding. ? Watch closely for changes in your health, and be sure to contact your doctor if: 
? · Your symptoms have not improved after 3 or 4 days. Where can you learn more? Go to http://theresa-soto.info/. Enter F228 in the search box to learn more about \"Hemorrhoids: Care Instructions. \" Current as of: May 12, 2017 Content Version: 11.4 © 3188-6120 [employer]. Care instructions adapted under license by [employer] (which disclaims liability or warranty for this information). If you have questions about a medical condition or this instruction, always ask your healthcare professional. [address] any warranty or liability for your use of this information. Introducing [facility]! Amari Monroe introduces Y'all patient portal. Now you can access parts of your medical record, email your doctor's office, and request medication refills online. 1. In your internet browser, go to [email or web address] 2. Click on the First Time User? Click Here link in the Sign In box. You will see the New Member Sign Up page. 3. Enter your Y'all Access Code exactly as it appears below. You will not need to use this code after youve completed the sign-up process. If you do not sign up before the expiration date, you must request a new code. · Y'all Access Code: [email or web address] Expires: 8/21/2018 11:51 AM 
 
4.  Enter the last four digits of your Social Security Number (xxxx) and Date of Birth (mm/dd/yyyy) as indicated and click Submit. You will be taken to the next sign-up page. 5. Create a [employer] ID. This will be your [employer] login ID and cannot be changed, so think of one that is secure and easy to remember. 6. Create a [employer] password. You can change your password at any time. 7. Enter your Password Reset Question and Answer. This can be used at a later time if you forget your password. 8. Enter your e-mail address. You will receive e-mail notification when new information is available in [address] 9. Click Sign Up. You can now view and download portions of your medical record. 10. Click the Download Summary menu link to download a portable copy of your medical information. If you have questions, please visit the Frequently Asked Questions section of the [employer] website. Remember, [employer] is NOT to be used for urgent needs. For medical emergencies, dial 911. Now available from your iPhone and Android! Please provide this summary of care documentation to your next provider. Your primary care clinician is listed as Sirisha Montes De Oca. If you have any questions after today's visit, please call [phone].

## 2018-05-23 NOTE — PROGRESS NOTES
HISTORY OF PRESENT ILLNESS  Miguel A Macdonald is a 80 y.o. female. HPI Comments: Miguel A Macdonald is here with her daughter due to rectal bleeding for the past 1 1/2 weeks. It started with constipation (which has resolved) and a hard bowel movement. There is a history of hemorrhoids. She is unsure if there is blood in the stool, however, there is BRB in the toilet bowel. Denies rectal itching, burning or pain. Nothing makes it worse or better. She had a normal colonoscopy 6 years ago. Review of Systems   Constitutional: Positive for malaise/fatigue. Negative for chills, fever and weight loss. Gastrointestinal: Negative for abdominal pain, constipation, diarrhea, melena, nausea and vomiting. Neurological: Negative for dizziness. Endo/Heme/Allergies: Does not bruise/bleed easily. Visit Vitals    /54 (BP 1 Location: Left arm, BP Patient Position: Sitting)    Pulse 83    Temp 97.5 °F (36.4 °C) (Oral)    Resp 16    Ht 5' (1.524 m)    Wt 115 lb 12.8 oz (52.5 kg)    SpO2 98%    BMI 22.62 kg/m2     Physical Exam   Constitutional: She is oriented to person, place, and time. She appears well-developed and well-nourished. No distress. Cardiovascular: Normal rate, regular rhythm and normal heart sounds. Exam reveals no gallop and no friction rub. No murmur heard. Pulmonary/Chest: Effort normal and breath sounds normal. No respiratory distress. She has no wheezes. She has no rales. Abdominal: Soft. Normal appearance and bowel sounds are normal. She exhibits no distension. There is no hepatosplenomegaly. There is no tenderness. There is no rebound and no guarding. Genitourinary: Rectal exam shows external hemorrhoid and guaiac positive stool. Rectal exam shows no fissure, no mass and no tenderness. Genitourinary Comments: Light brown stool   Neurological: She is alert and oriented to person, place, and time. Skin: Skin is warm and dry. She is not diaphoretic.    Nursing note and vitals reviewed. ASSESSMENT and PLAN    ICD-10-CM ICD-9-CM    1. Bright red rectal bleeding K62.5 569.3 CBC WITH AUTOMATED DIFF      REFERRAL TO GASTROENTEROLOGY      CANCELED: REFERRAL TO GASTROENTEROLOGY   2. Hemorrhoids, unspecified hemorrhoid type K64.9 455.6         Rectal bleeding, doubt this is hemorrhoidal in nature  Check CBC  Gastroenterology referral  Call right away if bleeding increases    Follow-up Disposition:  Return if symptoms worsen or fail to improve. Reviewed plan of care. Patient has provided input and agrees with goals.

## 2018-05-23 NOTE — PROGRESS NOTES
Claudia Jovel is a 80 y.o. female    Chief Complaint   Patient presents with    Anal Bleeding     bleeding off and on since 11th. at first thought constipation or hemorrhoids. 1. Have you been to the ER, urgent care clinic since your last visit? Hospitalized since your last visit?no    2. Have you seen or consulted any other health care providers outside of the [facility] since your last visit? Include any pap smears or colon screening.  [facility], Dr. Marjorie Triana /54 (BP 1 Location: Left arm, BP Patient Position: Sitting)    Pulse 83    Temp 97.5 °F (36.4 °C) (Oral)    Resp 16    Ht 5' (1.524 m)    Wt 115 lb 12.8 oz (52.5 kg)    SpO2 98%    BMI 22.62 kg/m2

## 2018-05-23 NOTE — PATIENT INSTRUCTIONS
Hemorrhoids: Care Instructions  Your Care Instructions    Hemorrhoids are enlarged veins that develop in the anal canal. Bleeding during bowel movements, itching, swelling, and rectal pain are the most common symptoms. They can be uncomfortable at times, but hemorrhoids rarely are a serious problem. You can treat most hemorrhoids with simple changes to your diet and bowel habits. These changes include eating more fiber and not straining to pass stools. Most hemorrhoids do not need surgery or other treatment unless they are very large and painful or bleed a lot. Follow-up care is a key part of your treatment and safety. Be sure to make and go to all appointments, and call your doctor if you are having problems. It's also a good idea to know your test results and keep a list of the medicines you take. How can you care for yourself at home? · Sit in a few inches of warm water (sitz bath) 3 times a day and after bowel movements. The warm water helps with pain and itching. · Put ice on your anal area several times a day for 10 minutes at a time. Put a thin cloth between the ice and your skin. Follow this by placing a warm, wet towel on the area for another 10 to 20 minutes. · Take pain medicines exactly as directed. ¨ If the doctor gave you a prescription medicine for pain, take it as prescribed. ¨ If you are not taking a prescription pain medicine, ask your doctor if you can take an over-the-counter medicine. · Keep the anal area clean, but be gentle. Use water and a fragrance-free soap, such as [place], or use baby wipes or medicated pads, such as Tucks. · Wear cotton underwear and loose clothing to decrease moisture in the anal area. · Eat more fiber. Include foods such as whole-grain breads and cereals, raw vegetables, raw and dried fruits, and beans. · Drink plenty of fluids, enough so that your urine is light yellow or clear like water.  If you have kidney, heart, or liver disease and have to limit fluids, talk with your doctor before you increase the amount of fluids you drink. · Use a stool softener that contains bran or psyllium. You can save money by buying bran or psyllium (available in bulk at most health food stores) and sprinkling it on foods or stirring it into fruit juice. Or you can use a product such as Metamucil or Hydrocil. · Practice healthy bowel habits. ¨ Go to the bathroom as soon as you have the urge. ¨ Avoid straining to pass stools. Relax and give yourself time to let things happen naturally. ¨ Do not hold your breath while passing stools. ¨ Do not read while sitting on the toilet. Get off the toilet as soon as you have finished. · Take your medicines exactly as prescribed. Call your doctor if you think you are having a problem with your medicine. When should you call for help? Call 911 anytime you think you may need emergency care. For example, call if:  ? · You pass maroon or very bloody stools. ?Call your doctor now or seek immediate medical care if:  ? · You have increased pain. ? · You have increased bleeding. ? Watch closely for changes in your health, and be sure to contact your doctor if:  ? · Your symptoms have not improved after 3 or 4 days. Where can you learn more? Go to http://theresa-soto.info/. Enter F228 in the search box to learn more about \"Hemorrhoids: Care Instructions. \"  Current as of: May 12, 2017  Content Version: 11.4  © 7351-5846 Codewise. Care instructions adapted under license by [employer] (which disclaims liability or warranty for this information). If you have questions about a medical condition or this instruction, always ask your healthcare professional. [place] [address] any warranty or liability for your use of this information.

## 2018-05-24 LAB
BASOPHILS # BLD AUTO: 0 X10E3/UL (ref 0–0.2)
BASOPHILS NFR BLD AUTO: 0 %
EOSINOPHIL # BLD AUTO: 0.1 X10E3/UL (ref 0–0.4)
EOSINOPHIL NFR BLD AUTO: 1 %
ERYTHROCYTE [DISTWIDTH] IN BLOOD BY AUTOMATED COUNT: 13.6 % (ref 12.3–15.4)
HCT VFR BLD AUTO: 39.7 % (ref 34–46.6)
HGB BLD-MCNC: 13.3 G/DL (ref 11.1–15.9)
IMM GRANULOCYTES # BLD: 0 X10E3/UL (ref 0–0.1)
IMM GRANULOCYTES NFR BLD: 0 %
LYMPHOCYTES # BLD AUTO: 1.4 X10E3/UL (ref 0.7–3.1)
LYMPHOCYTES NFR BLD AUTO: 24 %
MCH RBC QN AUTO: 31.5 PG (ref 26.6–33)
MCHC RBC AUTO-ENTMCNC: 33.5 G/DL (ref 31.5–35.7)
MCV RBC AUTO: 94 FL (ref 79–97)
MONOCYTES # BLD AUTO: 0.5 X10E3/UL (ref 0.1–0.9)
MONOCYTES NFR BLD AUTO: 8 %
NEUTROPHILS # BLD AUTO: 4 X10E3/UL (ref 1.4–7)
NEUTROPHILS NFR BLD AUTO: 67 %
PLATELET # BLD AUTO: 214 X10E3/UL (ref 150–379)
RBC # BLD AUTO: 4.22 X10E6/UL (ref 3.77–5.28)
WBC # BLD AUTO: 6 X10E3/UL (ref 3.4–10.8)

## 2018-06-28 ENCOUNTER — HOSPITAL ENCOUNTER (OUTPATIENT)
Dept: INFUSION THERAPY | Age: 83
Discharge: HOME | End: 2018-06-28
Payer: MEDICARE

## 2018-06-28 ENCOUNTER — TELEPHONE (OUTPATIENT)
Dept: FAMILY MEDICINE CLINIC | Age: 83
End: 2018-06-28

## 2018-06-28 VITALS
RESPIRATION RATE: 18 BRPM | DIASTOLIC BLOOD PRESSURE: 64 MMHG | SYSTOLIC BLOOD PRESSURE: 145 MMHG | TEMPERATURE: 97.6 F | HEART RATE: 76 BPM

## 2018-06-28 DIAGNOSIS — E83.51 HYPOCALCEMIA: Primary | ICD-10-CM

## 2018-06-28 LAB
ANION GAP BLD CALC-SCNC: 17 MMOL/L (ref 10–20)
BUN BLD-MCNC: 11 MG/DL (ref 9–20)
CA-I BLD-MCNC: 1.08 MMOL/L (ref 1.12–1.32)
CHLORIDE BLD-SCNC: 103 MMOL/L (ref 98–107)
CO2 BLD-SCNC: 26 MMOL/L (ref 21–32)
CREAT BLD-MCNC: 0.6 MG/DL (ref 0.6–1.3)
GLUCOSE BLD-MCNC: 138 MG/DL (ref 65–100)
HCT VFR BLD CALC: 36 % (ref 35–47)
MAGNESIUM SERPL-MCNC: 2.1 MG/DL (ref 1.6–2.4)
PHOSPHATE SERPL-MCNC: 3.2 MG/DL (ref 2.6–4.7)
POTASSIUM BLD-SCNC: 3.9 MMOL/L (ref 3.5–5.1)
SERVICE CMNT-IMP: ABNORMAL
SODIUM BLD-SCNC: 141 MMOL/L (ref 136–145)

## 2018-06-28 PROCEDURE — 84100 ASSAY OF PHOSPHORUS: CPT | Performed by: FAMILY MEDICINE

## 2018-06-28 PROCEDURE — 83735 ASSAY OF MAGNESIUM: CPT | Performed by: FAMILY MEDICINE

## 2018-06-28 PROCEDURE — 36415 COLL VENOUS BLD VENIPUNCTURE: CPT | Performed by: FAMILY MEDICINE

## 2018-06-28 PROCEDURE — 80047 BASIC METABLC PNL IONIZED CA: CPT

## 2018-06-28 NOTE — PROGRESS NOTES
Outpatient Infusion Center Short Visit Progress Note    1400 Patient admitted to [facility] for Prolia injection ambulatory in stable condition. Assessment completed. No new concerns voiced. Labs drawn. iStat Calcium 1.08-medication held per dosing parameters. Visit Vitals    /64    Pulse 76    Temp 97.6 °F (36.4 °C)    Resp 18       Recent Results (from the past 8 hour(s))   POC CHEM8    Collection Time: 06/28/18  2:19 PM   Result Value Ref Range    Calcium, ionized (POC) 1.08 (L) 1.12 - 1.32 mmol/L    Sodium (POC) 141 136 - 145 mmol/L    Potassium (POC) 3.9 3.5 - 5.1 mmol/L    Chloride (POC) 103 98 - 107 mmol/L    CO2 (POC) 26 21 - 32 mmol/L    Anion gap (POC) 17 10 - 20 mmol/L    Glucose (POC) 138 (H) 65 - 100 mg/dL    BUN (POC) 11 9 - 20 mg/dL    Creatinine (POC) 0.6 0.6 - 1.3 mg/dL    GFRAA, POC >60 >60 ml/min/1.73m2    GFRNA, POC >60 >60 ml/min/1.73m2    Hematocrit (POC) 36 35.0 - 47.0 %    Comment Comment Not Indicated. Medications:  None    1430 Instructed patient and daughter to call Dr. Elsy Celestin to discuss restarting Calcium and Vit D supplements as pt cannot remember if she is taking them or not. Pt states \"I don't think I am taking those\". Dose in PTA medications is below suggested dose on MD order. Alerted pt and daughter to this and encouraged to review with MD.  Patient discharged from [facility] ambulatory in no distress. Patient aware of next appointment scheduled for 12/27 @ 2p unless Dr. Elsy Celestin would like pt seen sooner and new order would be needed.

## 2018-06-28 NOTE — PROGRESS NOTES
Problem: Knowledge Deficit  Goal: *Verbalizes understanding of procedures and medications  Outcome: Progressing Towards Goal  Educated pt and daughter on recommended dose of Calcium and Vit D supplements

## 2018-06-28 NOTE — TELEPHONE ENCOUNTER
Pt daughter called office in regard to pt. Pt went to receive Prolia injection, today, but was turned away due to calcium level begin low. Pt's daughter states pt had not been taking her calcium medication, but will resume today. Pt's daughter asks how long should pt take the Calcium medication before she has levels rechecked so she can receive Prolia injection.

## 2018-06-29 NOTE — PERIOP NOTES
RN verified patient name and , and patient gave verbal permission for RN to complete PAT phone call with her daughter Jenn Emanuel.

## 2018-06-29 NOTE — PERIOP NOTES
[facility]  Ambulatory Surgery Unit  Pre-operative Instructions for Endo Procedures    Procedure Date  7/5/18    Tentative Arrival Time 0730      1. On the day of your procedure, please report to the Ambulatory Surgery Unit Registration Desk and sign in at your designated time. The Ambulatory Surgery Unit is located in [facility] on the [facility] side of the [facility] across from the [address] [place] [address]. Please have all of your health insurance cards and a photo ID. 2. You must have someone with you to drive you home, as you should not drive a car for 24 hours following anesthesia. Please make arrangements for a responsible adult friend or family member to stay with you for at least the first 24 hours after your procedure. 3. Do not have anything to eat or drink (including water, gum, mints, coffee, juice) after midnight  7/4/18. This may not apply to medications prescribed by your physician. (Please note below the special instructions with medications to take the morning of your procedure.)    4. If applicable, follow the clear liquid diet and bowel prep instructions provided by your physician's office. If you do not have this information, or have any questions, please contact your physician's office. 5. We recommend you do not drink any alcoholic beverages for 24 hours before and after your procedure. 6. Contact your surgeons office for instructions on the following medications: non-steroidal anti-inflammatory drugs (i.e. Advil, Aleve), vitamins, and supplements. (Some surgeons will want you to stop these medications prior to surgery and others may allow you to take them)   **If you are currently taking Plavix, Coumadin, Aspirin and/or other blood-thinning agents, contact your surgeon for instructions. ** Your surgeon will partner with the physician prescribing these medications to determine if it is safe to stop or if you need to continue taking.  Please do not stop taking these medications without instructions from your surgeon. 7. In an effort to help prevent surgical site infection, we ask that you shower with an anti-bacterial soap (i.e. Dial or Safeguard) on the morning of your procedure. Do not apply any lotions, powders, or deodorants after showering. 8. Wear comfortable clothes. Wear glasses instead of contacts. Do not bring any jewelry or money (other than copays or fees as instructed). Do not wear make-up, particularly mascara, the morning of your procedure. Wear your hair loose or down, no ponytails, buns, lottie pins or clips. All body piercings must be removed. 9. You should understand that if you do not follow these instructions your procedure may be cancelled. If your physical condition changes (i.e. fever, cold or flu) please contact your surgeon as soon as possible. 10. It is important that you be on time. If a situation occurs where you may be late, or if you have any questions or problems, please call [phone]. 11. Your procedure time may be subject to change. You will receive a phone call the day prior to confirm your arrival time. Special Instructions: Take all medications and inhalers, as prescribed, on the morning of surgery with a sip of water EXCEPT: none      Insulin Dependent Diabetic patients: Take your diabetic medications as prescribed the day before surgery. Hold all diabetic medications the day of surgery. If you are scheduled to arrive for surgery after 8:00 AM, and your AM blood sugar is >200, please call Ambulatory Surgery. I understand a pre-operative phone call will be made to verify my procedure time. In the event that I am not available, I give permission for a message to be left on my answering service and/or with another person? [phone]    THe above note was reviewed with patient's daughter Robert Hatfield) during PAT phone call on 6/29/18, patient's daughter verbalized understanding. ___________________      ___________________      ___________________  (Signature of Patient)          (Witness)                   (Date and Time)

## 2018-07-02 NOTE — TELEPHONE ENCOUNTER
Please call patient's daughter and let her know this is not due to the amount of calcium she is taking, it is due to how the body metabolizes it. I need to find out why, so she will need additional labs. A lab slip has been printed. Silvio Ornelas

## 2018-07-03 ENCOUNTER — ANESTHESIA EVENT (OUTPATIENT)
Dept: SURGERY | Age: 83
End: 2018-07-03
Payer: MEDICARE

## 2018-07-04 LAB
25(OH)D3+25(OH)D2 SERPL-MCNC: 18.6 NG/ML (ref 30–100)
PTH-INTACT SERPL-MCNC: 26 PG/ML (ref 15–65)

## 2018-07-05 ENCOUNTER — HOSPITAL ENCOUNTER (OUTPATIENT)
Age: 83
Setting detail: OUTPATIENT SURGERY
Discharge: HOME | End: 2018-07-05
Attending: SPECIALIST | Admitting: SPECIALIST
Payer: MEDICARE

## 2018-07-05 ENCOUNTER — ANESTHESIA (OUTPATIENT)
Dept: SURGERY | Age: 83
End: 2018-07-05
Payer: MEDICARE

## 2018-07-05 VITALS
WEIGHT: 111.6 LBS | BODY MASS INDEX: 21.07 KG/M2 | OXYGEN SATURATION: 94 % | SYSTOLIC BLOOD PRESSURE: 140 MMHG | HEART RATE: 74 BPM | TEMPERATURE: 98.4 F | HEIGHT: 61 IN | DIASTOLIC BLOOD PRESSURE: 62 MMHG | RESPIRATION RATE: 16 BRPM

## 2018-07-05 PROCEDURE — 74011250636 HC RX REV CODE- 250/636

## 2018-07-05 PROCEDURE — 74011000250 HC RX REV CODE- 250

## 2018-07-05 PROCEDURE — 76210000050 HC AMBSU PH II REC 0.5 TO 1 HR: Performed by: SPECIALIST

## 2018-07-05 PROCEDURE — 77030013992 HC SNR POLYP ENDOSC BSC -B: Performed by: SPECIALIST

## 2018-07-05 PROCEDURE — 76030000002 HC AMB SURG OR TIME FIRST 0.: Performed by: SPECIALIST

## 2018-07-05 PROCEDURE — 76060000073 HC AMB SURG ANES FIRST 0.5 HR: Performed by: SPECIALIST

## 2018-07-05 PROCEDURE — 77030009426 HC FCPS BIOP ENDOSC BSC -B: Performed by: SPECIALIST

## 2018-07-05 PROCEDURE — 77030020255 HC SOL INJ LR 1000ML BG: Performed by: SPECIALIST

## 2018-07-05 PROCEDURE — 88305 TISSUE EXAM BY PATHOLOGIST: CPT | Performed by: SPECIALIST

## 2018-07-05 PROCEDURE — 74011250636 HC RX REV CODE- 250/636: Performed by: ANESTHESIOLOGY

## 2018-07-05 PROCEDURE — 77030021352 HC CBL LD SYS DISP COVD -B: Performed by: SPECIALIST

## 2018-07-05 RX ORDER — DIPHENHYDRAMINE HYDROCHLORIDE 50 MG/ML
12.5 INJECTION, SOLUTION INTRAMUSCULAR; INTRAVENOUS AS NEEDED
Status: DISCONTINUED | OUTPATIENT
Start: 2018-07-05 | End: 2018-07-05 | Stop reason: HOSPADM

## 2018-07-05 RX ORDER — PROPOFOL 10 MG/ML
INJECTION, EMULSION INTRAVENOUS AS NEEDED
Status: DISCONTINUED | OUTPATIENT
Start: 2018-07-05 | End: 2018-07-05 | Stop reason: HOSPADM

## 2018-07-05 RX ORDER — ONDANSETRON 2 MG/ML
4 INJECTION INTRAMUSCULAR; INTRAVENOUS AS NEEDED
Status: DISCONTINUED | OUTPATIENT
Start: 2018-07-05 | End: 2018-07-05 | Stop reason: HOSPADM

## 2018-07-05 RX ORDER — LIDOCAINE HYDROCHLORIDE 20 MG/ML
INJECTION, SOLUTION EPIDURAL; INFILTRATION; INTRACAUDAL; PERINEURAL AS NEEDED
Status: DISCONTINUED | OUTPATIENT
Start: 2018-07-05 | End: 2018-07-05 | Stop reason: HOSPADM

## 2018-07-05 RX ORDER — SODIUM CHLORIDE 0.9 % (FLUSH) 0.9 %
5-10 SYRINGE (ML) INJECTION AS NEEDED
Status: DISCONTINUED | OUTPATIENT
Start: 2018-07-05 | End: 2018-07-05 | Stop reason: HOSPADM

## 2018-07-05 RX ORDER — LIDOCAINE HYDROCHLORIDE 10 MG/ML
0.1 INJECTION, SOLUTION EPIDURAL; INFILTRATION; INTRACAUDAL; PERINEURAL AS NEEDED
Status: DISCONTINUED | OUTPATIENT
Start: 2018-07-05 | End: 2018-07-05 | Stop reason: HOSPADM

## 2018-07-05 RX ORDER — FENTANYL CITRATE 50 UG/ML
25 INJECTION, SOLUTION INTRAMUSCULAR; INTRAVENOUS
Status: DISCONTINUED | OUTPATIENT
Start: 2018-07-05 | End: 2018-07-05 | Stop reason: HOSPADM

## 2018-07-05 RX ORDER — SODIUM CHLORIDE, SODIUM LACTATE, POTASSIUM CHLORIDE, CALCIUM CHLORIDE 600; 310; 30; 20 MG/100ML; MG/100ML; MG/100ML; MG/100ML
25 INJECTION, SOLUTION INTRAVENOUS CONTINUOUS
Status: DISCONTINUED | OUTPATIENT
Start: 2018-07-05 | End: 2018-07-05 | Stop reason: HOSPADM

## 2018-07-05 RX ORDER — SODIUM CHLORIDE 0.9 % (FLUSH) 0.9 %
5-10 SYRINGE (ML) INJECTION EVERY 8 HOURS
Status: DISCONTINUED | OUTPATIENT
Start: 2018-07-05 | End: 2018-07-05 | Stop reason: HOSPADM

## 2018-07-05 RX ADMIN — LIDOCAINE HYDROCHLORIDE 60 MG: 20 INJECTION, SOLUTION EPIDURAL; INFILTRATION; INTRACAUDAL; PERINEURAL at 08:46

## 2018-07-05 RX ADMIN — PROPOFOL 20 MG: 10 INJECTION, EMULSION INTRAVENOUS at 08:50

## 2018-07-05 RX ADMIN — PROPOFOL 20 MG: 10 INJECTION, EMULSION INTRAVENOUS at 08:55

## 2018-07-05 RX ADMIN — SODIUM CHLORIDE, SODIUM LACTATE, POTASSIUM CHLORIDE, AND CALCIUM CHLORIDE 25 ML/HR: 600; 310; 30; 20 INJECTION, SOLUTION INTRAVENOUS at 07:47

## 2018-07-05 RX ADMIN — PROPOFOL 60 MG: 10 INJECTION, EMULSION INTRAVENOUS at 08:46

## 2018-07-05 RX ADMIN — PROPOFOL 20 MG: 10 INJECTION, EMULSION INTRAVENOUS at 08:48

## 2018-07-05 RX ADMIN — PROPOFOL 20 MG: 10 INJECTION, EMULSION INTRAVENOUS at 08:59

## 2018-07-05 NOTE — PROCEDURES
Colonoscopy Procedure Note    Indications:   Hematochezia    Referring Physician: Izzy Sarkar MD  Anesthesia/Sedation: MAC anesthesia Propofol  Endoscopist:  Dr. Angel Avery    Procedure in Detail:  Informed consent was obtained for the procedure, including sedation. Risks of perforation, hemorrhage, adverse drug reaction, and aspiration were discussed. The patient was placed in the left lateral decubitus position. Based on the pre-procedure assessment, including review of the patient's medical history, medications, allergies, and review of systems, she had been deemed to be an appropriate candidate for moderate sedation; she was therefore sedated with the medications listed above. The patient was monitored continuously with ECG tracing, pulse oximetry, blood pressure monitoring, and direct observations. A rectal examination was performed. The [id number] was inserted into the rectum and advanced under direct vision to the cecum, which was identified by the ileocecal valve and appendiceal orifice. The quality of the colonic preparation was adequate. A careful inspection was made as the colonoscope was withdrawn, including a retroflexed view of the rectum; findings and interventions are described below. Appropriate photodocumentation was obtained. Findings:     1. Scope advanced to the cecum. 2.  Preparation was adequate. 3.  4 mm sessile polyp in the ascending colon s/p cold snare removal.       3 mm sessile polyp in distal rectum s/p cold forcep removal.    4.  Diffuse diverticulosis of sigmoid colon of moderate severity. 5.  Small internal hemorrhoids. Therapies:  See above    Specimen:  Specimens were collected as described above and sent to pathology. Complications: None were encountered during the procedure. EBL: < 10 ml.     Recommendations:   -f/u path  -repeat colonoscopy depending on path result  Signed By: Tisha Randhawa MD                        July 5, 2018

## 2018-07-05 NOTE — H&P
Gastroenterology Outpatient History and Physical    Patient: Santiago Freitas    Physician: Marina Boas, MD    Vital Signs: Blood pressure 154/86, pulse 90, temperature 97.7 °F (36.5 °C), resp. rate 22, height 5' 1\" (1.549 m), weight 50.6 kg (111 lb 9.6 oz), SpO2 97 %, not currently breastfeeding. Allergies: Allergies   Allergen Reactions    Codeine Nausea Only       Chief Complaint: Hematochezia    History of Present Illness: 81 yo F with recent hematochezia. Justification for Procedure: above    History:  Past Medical History:   Diagnosis Date    ACP (advance care planning) 3/22/2018    The patient brought in her signed advance directive today (3/22/18).  Joint pain     Osteoarthritis     Osteoporosis     Schatzki's ring       Past Surgical History:   Procedure Laterality Date    HX GI      Schatzkis ring dilitation    HX GYN      3 vaginal births    HX HEENT      bilateral cataracts    HX OTHER SURGICAL      Schatzki Ring Dilation      Social History     Social History    Marital status: UNKNOWN     Spouse name: N/A    Number of children: N/A    Years of education: N/A     Social History Main Topics    Smoking status: Never Smoker    Smokeless tobacco: Never Used    Alcohol use No    Drug use: No    Sexual activity: No     Other Topics Concern    None     Social History Narrative      Family History   Problem Relation Age of Onset    Hypertension Mother     Stroke Father     Lung Disease Brother     Cancer Brother      lung    Crohn's Disease Daughter     Thyroid Disease Daughter     Diabetes Son        Medications:   Prior to Admission medications    Medication Sig Start Date End Date Taking? Authorizing Provider   OTHER Take 2 Tabs by mouth daily. Indications: preservision areds   Yes Historical Provider   calcium-cholecalciferol, d3, (CALCIUM 600 + D) 600-125 mg-unit tab Take 1 Tab by mouth.    Yes Historical Provider   denosumab (PROLIA) 60 mg/mL injection 60 mg by SubCUTAneous route. Indications: twice yearly   Yes Historical Provider       Physical Exam:   General: alert, no distress   HEENT: Head: Normocephalic, no lesions, without obvious abnormality.    Heart: regular rate and rhythm, S1, S2 normal, no murmur, click, rub or gallop   Lungs: chest clear, no wheezing, rales, normal symmetric air entry   Abdominal: soft, NT/ND + BS   Neurological: Grossly normal   Extremities: extremities normal, atraumatic, no cyanosis or edema     Findings/Diagnosis: Hematochezia    Plan of Care/Planned Procedure: Colonoscopy    Signed By: Rodrick Yang MD     July 5, 2018

## 2018-07-05 NOTE — DISCHARGE INSTRUCTIONS
Khushbu Brown  [id number]  9/16/1931    COLON DISCHARGE INSTRUCTIONS  Discomfort:  Redness at IV site- apply warm compress to area; if redness or soreness persist- contact your physician  There may be a slight amount of blood passed from the rectum  Gaseous discomfort- walking, belching will help relieve any discomfort  You may not operate a vehicle for 24 hours  You may not engage in an occupation involving machinery or appliances for rest of today  You may not drink alcoholic beverages for at least 24 hours  Avoid making any critical decisions for at least 24 hour  DIET:   Regular diet. - however -  remember your colon is empty and a heavy meal will produce gas. Avoid these foods:  vegetables, fried / greasy foods, carbonated drinks for today. MEDICATIONS:        Regarding Aspirin or Nonsteroidal medications, please see below. ACTIVITY:  You may resume your normal daily activities it is recommended that you spend the remainder of the day resting -  avoid any strenuous activity. CALL M.D. ANY SIGN OF:  Increasing pain, nausea, vomiting  Abdominal distension (swelling)  New increased bleeding (oral or rectal)  Fever (chills)  Pain in chest area  Bloody discharge from nose or mouth  Shortness of breath    ONLY  Tylenol as needed for pain. Follow-up Instructions:   Call Dr. Rodriguez Mayo for results of procedure / biopsy in 4-5 days at telephone #  [phone]    DO NOT TAKE TYLENOL/ACETAMINOPHEN WITH PERCOCET, [address], [address] TAKE NARCOTIC PAIN MEDICATIONS WITH FOOD     If given 2 pain narcotics do NOT take together! Narcotics tend to be constipating, we suggest taking a stool softener such as Colace or Miralax (follow package instructions). DO NOT DRIVE WHILE TAKING NARCOTIC PAIN MEDICATIONS. DO NOT TAKE SLEEPING MEDICATIONS OR ANTIANXIETY MEDICATIONS WHILE TAKING NARCOTIC PAIN MEDICATIONS,  ESPECIALLY THE NIGHT OF ANESTHESIA.     CPAP PATIENTS BE SURE TO WEAR MACHINE WHENEVER NAPPING OR SLEEPING. DISCHARGE SUMMARY from Nurse    The following personal items collected during your admission are returned to you:   Dental Appliance: Dental Appliances: None  Vision: Visual Aid: None  Hearing Aid:    Jewelry:    Clothing:    Other Valuables:    Valuables sent to safe:        PATIENT INSTRUCTIONS:    After General Anesthesia or Intravenous Sedation, for 24 hours or while taking prescription Narcotics:        Someone should be with you for the next 24 hours. For your own safety, a responsible adult must drive you home. · Limit your activities  · Recommended activity: Rest today, up with assistance today. Do not climb stairs or shower unattended for the next 24 hours. · Please start with a soft bland diet and advance as tolerated (no nausea) to regular diet. · If you have a sore throat you should try the following: fluids, warm salt water gargles, or throat lozenges. If it does not improve after several days please follow up with your primary physician. · Do not drive and operate hazardous machinery  · Do not make important personal or business decisions  · Do  not drink alcoholic beverages  · If you have not urinated within 8 hours after discharge, please contact your surgeon on call. Report the following to your surgeon:  · Excessive pain, swelling, redness or odor of or around the surgical area  · Temperature over 100.5  · Nausea and vomiting lasting longer than 4 hours or if unable to take medications  · Any signs of decreased circulation or nerve impairment to extremity: change in color, persistent  numbness, tingling, coldness or increase pain      · You will receive a Post Operative Call from one of the Recovery Room Nurses on the day after your surgery to check on you. It is very important for us to know how you are recovering after your surgery. If you have an issue or need to speak with someone, please call your surgeon, do not wait for the post operative call.     · You may receive an e-mail or letter in the mail from [facility] regarding your experience with us in the Ambulatory Surgery Unit. Your feedback is valuable to us and we appreciate your participation in the survey. · If the above instructions are not adequate, please contact Margy Thomas RN, [facility] anesthesia Nurse Manager or our Anesthesiologist, at [phone]. If you are having problems after your surgery, call the physician at their office number. · We wish you a speedy recovery ? What to do at Home:      *  Please give a list of your current medications to your Primary Care Provider. *  Please update this list whenever your medications are discontinued, doses are      changed, or new medications (including over-the-counter products) are added. *  Please carry medication information at all times in case of emergency situations. These are general instructions for a healthy lifestyle:    No smoking/ No tobacco products/ Avoid exposure to second hand smoke    Surgeon General's Warning:  Quitting smoking now greatly reduces serious risk to your health. Obesity, smoking, and sedentary lifestyle greatly increases your risk for illness    A healthy diet, regular physical exercise & weight monitoring are important for maintaining a healthy lifestyle    You may be retaining fluid if you have a history of heart failure or if you experience any of the following symptoms:  Weight gain of 3 pounds or more overnight or 5 pounds in a week, increased swelling in our hands or feet or shortness of breath while lying flat in bed. Please call your doctor as soon as you notice any of these symptoms; do not wait until your next office visit.     Recognize signs and symptoms of STROKE:    B - Balance  E - Eyes    F-  Face looks uneven    A-  Arms unable to move or move even    S-  Speech slurred or non-existent    T-  Time-call 911 as soon as signs and symptoms begin-DO NOT go       Back to bed or wait to see if you get better-TIME IS BRAIN. If you have not received your influenza and/or pneumococcal vaccine, please follow up with your primary care physician. The discharge information has been reviewed with the caregiver. The caregiver verbalized understanding.

## 2018-07-05 NOTE — ANESTHESIA PREPROCEDURE EVALUATION
Anesthetic History   No history of anesthetic complications            Review of Systems / Medical History  Patient summary reviewed, nursing notes reviewed and pertinent labs reviewed    Pulmonary  Within defined limits                 Neuro/Psych   Within defined limits           Cardiovascular  Within defined limits                Exercise tolerance: >4 METS     GI/Hepatic/Renal  Within defined limits              Endo/Other        Arthritis     Other Findings   Comments: Hematochezia   Schatzki's ring           Physical Exam    Airway  Mallampati: II  TM Distance: 4 - 6 cm  Neck ROM: normal range of motion   Mouth opening: Normal     Cardiovascular  Regular rate and rhythm,  S1 and S2 normal,  no murmur, click, rub, or gallop             Dental  No notable dental hx       Pulmonary  Breath sounds clear to auscultation               Abdominal  GI exam deferred       Other Findings            Anesthetic Plan    ASA: 2  Anesthesia type: general and total IV anesthesia          Induction: Intravenous  Anesthetic plan and risks discussed with: Patient

## 2018-07-05 NOTE — IP AVS SNAPSHOT
[address] [facility] 
[phone] Patient: Yuniel Benitez MRN: [id number] SUM:8[birth year]98[birth year] About your hospitalization You were admitted on:  July 5, 2018 You last received care in the:  [facility] PACU You were discharged on:  July 5, 2018 Why you were hospitalized Your primary diagnosis was:  Not on File Follow-up Information None Discharge Orders None A check ed indicates which time of day the medication should be taken. My Medications CONTINUE taking these medications Instructions Each Dose to Equal  
 Morning Noon Evening Bedtime CALCIUM 600 + D 600-125 mg-unit Tab Generic drug:  calcium-cholecalciferol (d3) Your last dose was: Your next dose is: Take 1 Tab by mouth. 1 Tab OTHER Your last dose was: Your next dose is: Take 2 Tabs by mouth daily. Indications: preservision areds 2 Tab PROLIA 60 mg/mL injection Generic drug:  denosumab Your last dose was: Your next dose is:    
   
   
 60 mg by SubCUTAneous route. Indications: twice yearly 60 mg Discharge Instructions Yuniel Benitez 
[id number] 9/16/1931 COLON DISCHARGE INSTRUCTIONS Discomfort: 
Redness at IV site- apply warm compress to area; if redness or soreness persist- contact your physician There may be a slight amount of blood passed from the rectum Gaseous discomfort- walking, belching will help relieve any discomfort You may not operate a vehicle for 24 hours You may not engage in an occupation involving machinery or appliances for rest of today You may not drink alcoholic beverages for at least 24 hours Avoid making any critical decisions for at least 24 hour DIET: 
 Regular diet.  however -  remember your colon is empty and a heavy meal will produce gas. Avoid these foods:  vegetables, fried / greasy foods, carbonated drinks for today. MEDICATIONS: 
  
 
 Regarding Aspirin or Nonsteroidal medications, please see below. ACTIVITY: 
You may resume your normal daily activities it is recommended that you spend the remainder of the day resting -  avoid any strenuous activity. CALL M.D. ANY SIGN OF: Increasing pain, nausea, vomiting Abdominal distension (swelling) New increased bleeding (oral or rectal) Fever (chills) Pain in chest area Bloody discharge from nose or mouth Shortness of breath ONLY  Tylenol as needed for pain. Follow-up Instructions: 
 Call Dr. Randi Chung for results of procedure / biopsy in 4-5 days at telephone #  [phone] DO NOT TAKE TYLENOL/ACETAMINOPHEN WITH PERCOCET, LORTAB, [address] TAKE NARCOTIC PAIN MEDICATIONS WITH FOOD If given 2 pain narcotics do NOT take together! Narcotics tend to be constipating, we suggest taking a stool softener such as Colace or Miralax (follow package instructions). DO NOT DRIVE WHILE TAKING NARCOTIC PAIN MEDICATIONS. DO NOT TAKE SLEEPING MEDICATIONS OR ANTIANXIETY MEDICATIONS WHILE TAKING NARCOTIC PAIN MEDICATIONS,  ESPECIALLY THE NIGHT OF ANESTHESIA. CPAP PATIENTS BE SURE TO WEAR MACHINE WHENEVER NAPPING OR SLEEPING. DISCHARGE SUMMARY from Nurse The following personal items collected during your admission are returned to you:  
Dental Appliance: Dental Appliances: None Vision: Visual Aid: None Hearing Aid:   
Jewelry:   
Clothing:   
Other Valuables:   
Valuables sent to safe:   
 
 
PATIENT INSTRUCTIONS: 
 
 
B - Balance E - Eyes F-  Face looks uneven A-  Arms unable to move or move even S-  Speech slurred or non-existent T-  Time-call 911 as soon as signs and symptoms begin-DO NOT go Back to bed or wait to see if you get better-TIME IS BRAIN. If you have not received your influenza and/or pneumococcal vaccine, please follow up with your primary care physician. The discharge information has been reviewed with the caregiver. The caregiver verbalized understanding. Introducing [facility]! Syl Rodriguez introduces [employer] patient portal. Now you can access parts of your medical record, email your doctor's office, and request medication refills online.    
 
1. In your internet browser, go to [email or web address] 2. Click on the First Time User? Click Here link in the Sign In box. You will see the New Member Sign Up page. 3. Enter your BriefMe Access Code exactly as it appears below. You will not need to use this code after youve completed the sign-up process. If you do not sign up before the expiration date, you must request a new code. · BriefMe Access Code: [email or web address] Expires: 8/21/2018 11:51 AM 
 
4. Enter the last four digits of your Social Security Number (xxxx) and Date of Birth (mm/dd/yyyy) as indicated and click Submit. You will be taken to the next sign-up page. 5. Create a Vookt ID. This will be your BriefMe login ID and cannot be changed, so think of one that is secure and easy to remember. 6. Create a BriefMe password. You can change your password at any time. 7. Enter your Password Reset Question and Answer. This can be used at a later time if you forget your password. 8. Enter your e-mail address. You will receive e-mail notification when new information is available in [address] 9. Click Sign Up. You can now view and download portions of your medical record. 10. Click the Download Summary menu link to download a portable copy of your medical information. If you have questions, please visit the Frequently Asked Questions section of the BriefMe website. Remember, BriefMe is NOT to be used for urgent needs. For medical emergencies, dial 911. Now available from your iPhone and Android! Introducing Derick Barone As a [employer] patient, I wanted to make you aware of our electronic visit tool called Derick Barone. [employer] 24/7 allows you to connect within minutes with a medical provider 24 hours a day, seven days a week via a mobile device or tablet or logging into a secure website from your computer. You can access Derick Barone from anywhere in the [place]. A virtual visit might be right for you when you have a simple condition and feel like you just dont want to get out of bed, or cant get away from work for an appointment, when your regular [facility] provider is not available (evenings, weekends or holidays), or when youre out of town and need minor care. Electronic visits cost only $49 and if the [facility] 24/7 provider determines a prescription is needed to treat your condition, one can be electronically transmitted to a nearby pharmacy*. Please take a moment to enroll today if you have not already done so. The enrollment process is free and takes just a few minutes. To enroll, please download the [employer] 24/Transerv jony to your tablet or phone, or visit [email or web address] to enroll on your computer. And, as an [address] [place] [address] patient with a [employer] account, the results of your visits will be scanned into your electronic medical record and your primary care provider will be able to view the scanned results. We urge you to continue to see your regular [facility] provider for your ongoing medical care. And while your primary care provider may not be the one available when you seek a [employer] virtual visit, the peace of mind you get from getting a real diagnosis real time can be priceless. For more information on [employer], view our Frequently Asked Questions (FAQs) at [email or web address]. Sincerely, 
 
Jabari Gardner MD 
Chief Medical Officer Roel Camilo *:  certain medications cannot be prescribed via [employer] Providers Seen During Your Hospitalization Provider Specialty Primary office phone Oj Shaffer MD Gastroenterology [phone] Your Primary Care Physician (PCP) Primary Care Physician Office Phone Office Fax Pauline Tse [phone] You are allergic to the following Allergen Reactions Codeine Nausea Only Recent Documentation Height Weight Breastfeeding? BMI OB Status Smoking Status 1.549 m 50.6 kg No 21.09 kg/m2 Postmenopausal Never Smoker Emergency Contacts Name Discharge Info Relation Home Work Mobile [address] CAREGIVER [3] Child [2] [phone] Patient Belongings The following personal items are in your possession at time of discharge: 
  Dental Appliances: None  Visual Aid: None Please provide this summary of care documentation to your next provider. Signatures-by signing, you are acknowledging that this After Visit Summary has been reviewed with you and you have received a copy. Patient Signature:  ____________________________________________________________ Date:  ____________________________________________________________  
  
[facility] Provider Signature:  ____________________________________________________________ Date:  ____________________________________________________________

## 2018-07-05 NOTE — ANESTHESIA POSTPROCEDURE EVALUATION
Post-Anesthesia Evaluation and Assessment    Patient: Alisha Vallejo MRN: [id number]  SSN: [id number]    YOB: 1931  Age: 80 y.o. Sex: female       Cardiovascular Function/Vital Signs  Visit Vitals    /62 (BP 1 Location: Left arm, BP Patient Position: At rest)    Pulse 74    Temp 36.9 °C (98.4 °F)    Resp 16    Ht 5' 1\" (1.549 m)    Wt 50.6 kg (111 lb 9.6 oz)    SpO2 94%    Breastfeeding No    BMI 21.09 kg/m2       Patient is status post general, total IV anesthesia anesthesia for Procedure(s):  COLONOSCOPY (LATEX ALLERGY)  COLONOSCOPY WITH POLYPECTOMY  COLONOSCOPY WITH BIOPSY. Nausea/Vomiting: None    Postoperative hydration reviewed and adequate. Pain:  Pain Scale 1: Numeric (0 - 10) (07/05/18 0919)  Pain Intensity 1: 0 (07/05/18 0919)   Managed    Neurological Status:   Neuro (WDL): Within Defined Limits (07/05/18 0919)  Neuro  Neurologic State: Alert (07/05/18 0919)   At baseline    Mental Status and Level of Consciousness: Arousable    Pulmonary Status:   O2 Device: Room air (07/05/18 0919)   Adequate oxygenation and airway patent    Complications related to anesthesia: None    Post-anesthesia assessment completed.  No concerns    Signed By: Lexus Roland MD     July 5, 2018

## 2018-07-05 NOTE — PERIOP NOTES
Permission received to review discharge instructions and discuss private health information with Daughter -Navarro Carson. 0911-Daughter brought to bedside and updated on pt's status. 0918-Dr Feldman by to discuss procedural findings with pt's daughter and pt. Daughter receiving d/c instructions. 0922-D/c instructions completed, all questions answered. VSS. Pt declines liquids at this time. 0935-Transported via w/c to awaiting transportation.

## 2018-07-05 NOTE — PERIOP NOTES
[address] [facility]  9/16/1931  [id number]    Situation:  Verbal report given from: PIPE Duff CRNA, RN  Procedure: Procedure(s):  COLONOSCOPY (LATEX ALLERGY)  COLONOSCOPY WITH POLYPECTOMY  COLONOSCOPY WITH BIOPSY    Background:    Preoperative diagnosis: CHANGE IN BOWEL HABITS/HEMATOCHEZIA    Postoperative diagnosis: Ascending colon polyp, diverticulosis     :  Dr. Alla Alexander    Assistant(s): Circ-1: Ruthie Murray RN  Circ-2: Josefa Ruggiero RN  Scrub Tech-1: Ellyn Acuña    Specimens:   ID Type Source Tests Collected by Time Destination   1 : Ascending colon polyp Preservative Colon, Transverse  Malena Delong MD 7/5/2018 7208 Pathology   2 : Distal rectum biopsy Preservative Rectal  Malena Delong MD 7/5/2018 1345 Pathology       Assessment:  Intra-procedure medications   Propofol 140 mg      Anesthesia gave intra-procedure sedation and medications, see anesthesia flow sheet     Intravenous fluids: LR@ KVO     Vital signs stable     Abdominal assessment: round and soft non tender      Recommendation:        All side rails up, bed in low position, wheels locked. Nurse at bedside.

## 2018-07-05 NOTE — PERIOP NOTES
Permission received to review discharge instructions and discuss private health information with daughter Aryanor Co.

## 2018-07-06 ENCOUNTER — PATIENT OUTREACH (OUTPATIENT)
Dept: FAMILY MEDICINE CLINIC | Age: 83
End: 2018-07-06

## 2018-07-06 NOTE — PROGRESS NOTES
Hospital Discharge Follow-Up      Date/Time:  2018 11:26 AM    Patient was admitted to [facility] on 18 and discharged on 18 for colonoscopy . The physician discharge summary was available at the time of outreach. Patient was contacted within 1 business days of discharge. Method of communication with provider : staff message     Inpatient RRAT score: NA  Was this a readmission? no   Patient stated reason for the readmission: NA    Nurse Navigator (NN) contacted the family by telephone to perform post hospital discharge assessment. Verified name and  with family as identifiers. Provided introduction to self, and explanation of the Nurse Navigator role. Reviewed discharge instructions and red flags with family who verbalized understanding. Family given an opportunity to ask questions and does not have any further questions or concerns at this time. The family agrees to contact the PCP office for questions related to their healthcare. NN provided contact information for future reference. Disease Specific:   N/A        Home Health orders at discharge: Yaniv Engle 8141: NA  Date of initial visit: [facility]    Durable Medical Equipment ordered/company: [facility]  Durable Medical Equipment received:[facility]      [address]:   Does patient have an Advance Directive:  not on file     Medication(s):     New Medications at Discharge: NA   Changed Medications at Discharge: NA  Discontinued Medications at Discharge: NA    Medication reconciliation was performed with family, who verbalizes understanding of administration of home medications. There were no barriers to obtaining medications identified at this time. Referral to Pharm D needed: no     Current Outpatient Prescriptions   Medication Sig    OTHER Take 2 Tabs by mouth daily. Indications: preservision areds    calcium-cholecalciferol, d3, (CALCIUM 600 + D) 600-125 mg-unit tab Take 1 Tab by mouth.     denosumab (PROLIA) 60 mg/mL injection 60 mg by SubCUTAneous route. Indications: twice yearly     No current facility-administered medications for this visit. There are no discontinued medications.     PCP/Specialist follow up: Future Appointments  Date Time Provider Hi Alberts   12/27/2018 2:00 PM [place] FT CHAIR 1 [address] [facility] Provider Appointment: [facility]  [facility]:  n/a

## 2018-07-08 DIAGNOSIS — E55.9 VITAMIN D DEFICIENCY: Primary | ICD-10-CM

## 2018-07-08 RX ORDER — ERGOCALCIFEROL 1.25 MG/1
50000 CAPSULE ORAL
Qty: 24 CAP | Refills: 0 | Status: SHIPPED | OUTPATIENT
Start: 2018-07-09 | End: 2018-09-28

## 2018-07-09 LAB — COLONOSCOPY, EXTERNAL: NORMAL

## 2018-07-12 ENCOUNTER — TELEPHONE (OUTPATIENT)
Dept: FAMILY MEDICINE CLINIC | Age: 83
End: 2018-07-12

## 2018-07-12 NOTE — TELEPHONE ENCOUNTER
Pt's daughter called from [address] [facility] stating she was advised  pt's prescription vitamin D had been sent by Dr. Kasie Tijerina but pharmacy has no record of it. Pt's daughter advised prescription was printed but will call in now and reminded to have pt only take it twice weekly for 12 weeks as ordered by Dr. Kasie Tijerina and to have labs redrawn after this to check vitamin D level again and she agrees to plan. Prescription called in to Alison at [address] [facility].  Fei

## 2018-07-12 NOTE — TELEPHONE ENCOUNTER
Called and spoke with pt's daughter and she has been advised and states understanding of pt's results per lab letter.

## 2018-07-12 NOTE — TELEPHONE ENCOUNTER
----- Message from Jeff Lin sent at 7/12/2018 10:06 AM EDT -----  Regarding: Dr. Aldair Torres [phone]   Uvaldo Hedrick, daughter, would like a call back with the pt's lab results performed on Tuesday, 07/03/18 concerning pt's Vitamin D or Calcium.

## 2018-08-06 ENCOUNTER — PATIENT OUTREACH (OUTPATIENT)
Dept: FAMILY MEDICINE CLINIC | Age: 83
End: 2018-08-06

## 2018-10-03 ENCOUNTER — TELEPHONE (OUTPATIENT)
Dept: FAMILY MEDICINE CLINIC | Age: 83
End: 2018-10-03

## 2018-10-03 NOTE — TELEPHONE ENCOUNTER
Pt daughter, Bev Tatum states Ms Anuel Lee as completed taking Vit D for 12 weeks and is asking if needs to be tested again? Also asking about taking Prolia injection.  Please call [phone]

## 2018-10-03 NOTE — TELEPHONE ENCOUNTER
Yes, she needs her vitamin D checked right away. She should have a lab slip. Also, what is her question concerning Prolia?

## 2018-10-03 NOTE — TELEPHONE ENCOUNTER
Called and spoke with pt's daughter and she has been advised and states that pt should have vitamin d levels checked at this time. Pt's daughter states she will bring pt to office tomorrow and have her labs drawn. Pt's daughter states pt needs new order for Prolia injections, if vitamin d level is OK.

## 2018-10-04 ENCOUNTER — APPOINTMENT (OUTPATIENT)
Dept: GENERAL RADIOLOGY | Age: 83
End: 2018-10-04
Attending: EMERGENCY MEDICINE
Payer: MEDICARE

## 2018-10-04 ENCOUNTER — HOSPITAL ENCOUNTER (EMERGENCY)
Age: 83
Discharge: HOME | End: 2018-10-04
Attending: EMERGENCY MEDICINE
Payer: MEDICARE

## 2018-10-04 ENCOUNTER — OFFICE VISIT (OUTPATIENT)
Dept: FAMILY MEDICINE CLINIC | Age: 83
End: 2018-10-04

## 2018-10-04 ENCOUNTER — TELEPHONE (OUTPATIENT)
Dept: FAMILY MEDICINE CLINIC | Age: 83
End: 2018-10-04

## 2018-10-04 VITALS
SYSTOLIC BLOOD PRESSURE: 149 MMHG | RESPIRATION RATE: 18 BRPM | WEIGHT: 112 LBS | BODY MASS INDEX: 21.14 KG/M2 | TEMPERATURE: 97.8 F | HEIGHT: 61 IN | HEART RATE: 72 BPM | DIASTOLIC BLOOD PRESSURE: 66 MMHG | OXYGEN SATURATION: 99 %

## 2018-10-04 VITALS
WEIGHT: 112 LBS | BODY MASS INDEX: 21.14 KG/M2 | OXYGEN SATURATION: 98 % | SYSTOLIC BLOOD PRESSURE: 158 MMHG | RESPIRATION RATE: 16 BRPM | DIASTOLIC BLOOD PRESSURE: 77 MMHG | HEIGHT: 61 IN | HEART RATE: 59 BPM | TEMPERATURE: 98.1 F

## 2018-10-04 DIAGNOSIS — R07.9 CHEST PAIN, UNSPECIFIED TYPE: Primary | ICD-10-CM

## 2018-10-04 DIAGNOSIS — R53.83 FATIGUE, UNSPECIFIED TYPE: ICD-10-CM

## 2018-10-04 DIAGNOSIS — R53.1 WEAKNESS: ICD-10-CM

## 2018-10-04 DIAGNOSIS — R42 DIZZINESS: ICD-10-CM

## 2018-10-04 DIAGNOSIS — R11.0 NAUSEA: ICD-10-CM

## 2018-10-04 DIAGNOSIS — N30.00 ACUTE CYSTITIS WITHOUT HEMATURIA: Primary | ICD-10-CM

## 2018-10-04 DIAGNOSIS — Z91.89 AT RISK FOR DEHYDRATION DUE TO POOR FLUID INTAKE: ICD-10-CM

## 2018-10-04 LAB
ALBUMIN SERPL-MCNC: 3.9 G/DL (ref 3.5–5)
ALBUMIN/GLOB SERPL: 1.3 {RATIO} (ref 1.1–2.2)
ALP SERPL-CCNC: 53 U/L (ref 45–117)
ALT SERPL-CCNC: 19 U/L (ref 12–78)
AMORPH CRY URNS QL MICRO: ABNORMAL
ANION GAP SERPL CALC-SCNC: 7 MMOL/L (ref 5–15)
APPEARANCE UR: ABNORMAL
AST SERPL-CCNC: 19 U/L (ref 15–37)
ATRIAL RATE: 55 BPM
BACTERIA URNS QL MICRO: ABNORMAL /HPF
BASOPHILS # BLD: 0 K/UL (ref 0–0.1)
BASOPHILS NFR BLD: 0 % (ref 0–1)
BILIRUB SERPL-MCNC: 0.6 MG/DL (ref 0.2–1)
BILIRUB UR QL: NEGATIVE
BUN SERPL-MCNC: 16 MG/DL (ref 6–20)
BUN/CREAT SERPL: 21 (ref 12–20)
CALCIUM SERPL-MCNC: 8.8 MG/DL (ref 8.5–10.1)
CALCULATED P AXIS, ECG09: 67 DEGREES
CALCULATED R AXIS, ECG10: -7 DEGREES
CALCULATED T AXIS, ECG11: 55 DEGREES
CHLORIDE SERPL-SCNC: 104 MMOL/L (ref 97–108)
CO2 SERPL-SCNC: 30 MMOL/L (ref 21–32)
COLOR UR: ABNORMAL
CREAT SERPL-MCNC: 0.76 MG/DL (ref 0.55–1.02)
DIAGNOSIS, 93000: NORMAL
DIFFERENTIAL METHOD BLD: ABNORMAL
EOSINOPHIL # BLD: 0 K/UL (ref 0–0.4)
EOSINOPHIL NFR BLD: 0 % (ref 0–7)
EPITH CASTS URNS QL MICRO: ABNORMAL /LPF
ERYTHROCYTE [DISTWIDTH] IN BLOOD BY AUTOMATED COUNT: 12.3 % (ref 11.5–14.5)
FLUAV AG NPH QL IA: NEGATIVE
FLUBV AG NOSE QL IA: NEGATIVE
GLOBULIN SER CALC-MCNC: 3.1 G/DL (ref 2–4)
GLUCOSE SERPL-MCNC: 106 MG/DL (ref 65–100)
GLUCOSE UR STRIP.AUTO-MCNC: NEGATIVE MG/DL
HCT VFR BLD AUTO: 38.4 % (ref 35–47)
HGB BLD-MCNC: 13.1 G/DL (ref 11.5–16)
HGB UR QL STRIP: NEGATIVE
IMM GRANULOCYTES # BLD: 0 K/UL (ref 0–0.04)
IMM GRANULOCYTES NFR BLD AUTO: 0 % (ref 0–0.5)
KETONES UR QL STRIP.AUTO: 15 MG/DL
LACTATE SERPL-SCNC: 1.1 MMOL/L (ref 0.4–2)
LEUKOCYTE ESTERASE UR QL STRIP.AUTO: ABNORMAL
LYMPHOCYTES # BLD: 1.5 K/UL (ref 0.8–3.5)
LYMPHOCYTES NFR BLD: 18 % (ref 12–49)
MCH RBC QN AUTO: 31.2 PG (ref 26–34)
MCHC RBC AUTO-ENTMCNC: 34.1 G/DL (ref 30–36.5)
MCV RBC AUTO: 91.4 FL (ref 80–99)
MONOCYTES # BLD: 0.4 K/UL (ref 0–1)
MONOCYTES NFR BLD: 5 % (ref 5–13)
NEUTS SEG # BLD: 5.9 K/UL (ref 1.8–8)
NEUTS SEG NFR BLD: 75 % (ref 32–75)
NITRITE UR QL STRIP.AUTO: NEGATIVE
NRBC # BLD: 0 K/UL (ref 0–0.01)
NRBC BLD-RTO: 0 PER 100 WBC
P-R INTERVAL, ECG05: 148 MS
PH UR STRIP: 7.5 [PH] (ref 5–8)
PLATELET # BLD AUTO: 230 K/UL (ref 150–400)
PMV BLD AUTO: 8.8 FL (ref 8.9–12.9)
POTASSIUM SERPL-SCNC: 4.1 MMOL/L (ref 3.5–5.1)
PROT SERPL-MCNC: 7 G/DL (ref 6.4–8.2)
PROT UR STRIP-MCNC: NEGATIVE MG/DL
Q-T INTERVAL, ECG07: 426 MS
QRS DURATION, ECG06: 88 MS
QTC CALCULATION (BEZET), ECG08: 407 MS
RBC # BLD AUTO: 4.2 M/UL (ref 3.8–5.2)
RBC #/AREA URNS HPF: ABNORMAL /HPF (ref 0–5)
SODIUM SERPL-SCNC: 141 MMOL/L (ref 136–145)
SP GR UR REFRACTOMETRY: 1.02 (ref 1–1.03)
TROPONIN I SERPL-MCNC: <0.05 NG/ML
UROBILINOGEN UR QL STRIP.AUTO: 0.2 EU/DL (ref 0.2–1)
VENTRICULAR RATE, ECG03: 55 BPM
WBC # BLD AUTO: 7.9 K/UL (ref 3.6–11)
WBC URNS QL MICRO: ABNORMAL /HPF (ref 0–4)

## 2018-10-04 PROCEDURE — 84484 ASSAY OF TROPONIN QUANT: CPT | Performed by: EMERGENCY MEDICINE

## 2018-10-04 PROCEDURE — 99284 EMERGENCY DEPT VISIT MOD MDM: CPT

## 2018-10-04 PROCEDURE — 93005 ELECTROCARDIOGRAM TRACING: CPT

## 2018-10-04 PROCEDURE — 87804 INFLUENZA ASSAY W/OPTIC: CPT | Performed by: PHYSICIAN ASSISTANT

## 2018-10-04 PROCEDURE — 96360 HYDRATION IV INFUSION INIT: CPT

## 2018-10-04 PROCEDURE — 80053 COMPREHEN METABOLIC PANEL: CPT | Performed by: EMERGENCY MEDICINE

## 2018-10-04 PROCEDURE — 83605 ASSAY OF LACTIC ACID: CPT | Performed by: EMERGENCY MEDICINE

## 2018-10-04 PROCEDURE — 74011250637 HC RX REV CODE- 250/637: Performed by: PHYSICIAN ASSISTANT

## 2018-10-04 PROCEDURE — 71046 X-RAY EXAM CHEST 2 VIEWS: CPT

## 2018-10-04 PROCEDURE — 36415 COLL VENOUS BLD VENIPUNCTURE: CPT | Performed by: EMERGENCY MEDICINE

## 2018-10-04 PROCEDURE — 87086 URINE CULTURE/COLONY COUNT: CPT | Performed by: EMERGENCY MEDICINE

## 2018-10-04 PROCEDURE — 87040 BLOOD CULTURE FOR BACTERIA: CPT | Performed by: EMERGENCY MEDICINE

## 2018-10-04 PROCEDURE — 74011250636 HC RX REV CODE- 250/636: Performed by: PHYSICIAN ASSISTANT

## 2018-10-04 PROCEDURE — 81001 URINALYSIS AUTO W/SCOPE: CPT | Performed by: EMERGENCY MEDICINE

## 2018-10-04 PROCEDURE — 85025 COMPLETE CBC W/AUTO DIFF WBC: CPT | Performed by: EMERGENCY MEDICINE

## 2018-10-04 RX ORDER — CEPHALEXIN 500 MG/1
500 CAPSULE ORAL 2 TIMES DAILY
Qty: 10 CAP | Refills: 0 | Status: SHIPPED | OUTPATIENT
Start: 2018-10-04 | End: 2018-10-09

## 2018-10-04 RX ORDER — ONDANSETRON 4 MG/1
4 TABLET, ORALLY DISINTEGRATING ORAL
Qty: 10 TAB | Refills: 0 | Status: SHIPPED | OUTPATIENT
Start: 2018-10-04 | End: 2018-10-11

## 2018-10-04 RX ORDER — CEPHALEXIN 250 MG/1
500 CAPSULE ORAL
Status: COMPLETED | OUTPATIENT
Start: 2018-10-04 | End: 2018-10-04

## 2018-10-04 RX ADMIN — SODIUM CHLORIDE 1000 ML: 900 INJECTION, SOLUTION INTRAVENOUS at 14:49

## 2018-10-04 RX ADMIN — CEPHALEXIN 500 MG: 250 CAPSULE ORAL at 16:46

## 2018-10-04 NOTE — ED NOTES
Patient discharged by PA. Papers given and questions answered. Home with family.  PT walked to car by RN

## 2018-10-04 NOTE — ED PROVIDER NOTES
HPI Comments: 12:31 PM 
I have evaluated the patient as the Provider in Triage. I have reviewed Her vital signs and the triage nurse assessment. I have talked with the patient and any available family and advised that I am the provider in triage and have ordered the appropriate study to initiate their work up based on the clinical presentation during my assessment. I have advised that the patient will be accommodated in the Main ED as soon as possible. I have also requested to contact the triage nurse or myself immediately if the patient experiences any changes in their condition during this brief waiting period. Bryson Dejesus MD 
 
 
Pt. Presents to the ER from her doctor's office. Pt. Was seen today for generalized weakness, fatigue, anorexia, and nausea.  +dizziness, +lightheadedness. Pt's sx have been present for two days. +chest heaviness. Pt's sx feel like her sx are when she had \"walking pneumonia\" in the past.   
 
80 y.o. female with past medical history significant for osteoporosis, osteoarthritis, schatzki's ring, and vitamin D deficiency who presents from home with chief complaint of lightheadedness. Pt reports lightheadedness after standing up this morning. Pt notes yesterday she felt weak and fatigued. Pt also c/o mild HA, nausea, chest heaviness, and SOB. Per daughter, Pt had bloody stool and had two polyps removed in the past. Pt denies recent sickness. Pt denies syncope, vision changes, fever, cough, congestion, abdominal pain, difficulty urinating, and blood in stools. There are no other acute medical concerns at this time. PCP: Page Fregoso MD 
 
Note written by Brandy Givens, as dictated by TRINITY Arzola 1:15 PM 
 
 
The history is provided by the patient. Past Medical History:  
Diagnosis Date  ACP (advance care planning) 3/22/2018 The patient brought in her signed advance directive today (3/22/18).  Joint pain  Osteoarthritis  Osteoporosis  Schatzki's ring  Vitamin D deficiency 7/8/2018 Past Surgical History:  
Procedure Laterality Date  COLONOSCOPY N/A 7/5/2018 COLONOSCOPY (LATEX ALLERGY) performed by [facility] Beth [facility]ian [facility] at [facility]  
 HX COLONOSCOPY    
 HX GI Schatzkis ring dilitation  HX GYN    
 3 vaginal births  HX HEENT    
 bilateral cataracts  HX OTHER SURGICAL Schatzki Ring Dilation Family History:  
Problem Relation Age of Onset  Hypertension Mother  Stroke Father  Lung Disease Brother  Cancer Brother   
  lung  Crohn's Disease Daughter  Thyroid Disease Daughter  Diabetes Son   
 
 
Social History Social History  Marital status: UNKNOWN Spouse name: N/A  
 Number of children: N/A  
 Years of education: N/A Occupational History  Not on file. Social History Main Topics  Smoking status: Never Smoker  Smokeless tobacco: Never Used  Alcohol use No  
 Drug use: No  
 Sexual activity: No  
 
Other Topics Concern  Not on file Social History Narrative ALLERGIES: Codeine Review of Systems Constitutional: Negative for fever. Eyes: Negative for discharge. Respiratory: Negative for cough. Cardiovascular: Positive for chest pain (\"heaviness\"). Gastrointestinal: Positive for nausea. Negative for diarrhea and vomiting. Genitourinary: Negative for difficulty urinating. Musculoskeletal: Negative for neck stiffness. Skin: Negative for wound. Neurological: Positive for light-headedness and headaches. Negative for syncope. All other systems reviewed and are negative. Vitals:  
 10/04/18 1232 BP: 151/67 Pulse: 69 Resp: 18 Temp: 98.1 °F (36.7 °C) SpO2: 97% Weight: 50.8 kg (112 lb) Height: 5' 1\" (1.549 m) Physical Exam  
Constitutional: She is oriented to person, place, and time. She appears well-developed and well-nourished. No distress. Pleasant, elderly WF. Appears younger than stated age, looks somewhat weak. HENT:  
Head: Normocephalic and atraumatic. Right Ear: External ear normal.  
Left Ear: External ear normal.  
Eyes: Conjunctivae are normal. No scleral icterus. Neck: Neck supple. No tracheal deviation present. Cardiovascular: Normal rate, regular rhythm and normal heart sounds. Exam reveals no gallop and no friction rub. No murmur heard. Pulmonary/Chest: Effort normal and breath sounds normal. No stridor. No respiratory distress. She has no wheezes. Abdominal: Soft. She exhibits no distension. There is no tenderness. Musculoskeletal: Normal range of motion. Neurological: She is alert and oriented to person, place, and time. No cranial nerve deficit (grossly intact). Skin: Skin is warm and dry. Psychiatric: She has a normal mood and affect. Her behavior is normal.  
Nursing note and vitals reviewed. MDM Number of Diagnoses or Management Options Diagnosis management comments: 80year old female presenting or 2 days of nausea, lightheadedness, some chest heaviness. No fever. Labs, EKG, CXR reassuring. UA c/w possible infection, given age, symptoms, will treat. Pt much improved with fluid bolus in the ED, + ketonuria, discussed possibility of symptoms related to dehydration as well. Encouraged oral hydration, specific return precautions discussed. Amount and/or Complexity of Data Reviewed Clinical lab tests: ordered and reviewed Tests in the radiology section of CPT®: ordered and reviewed Discuss the patient with other providers: yes (Dr. Evan Cueto, ED attending. Dr. Toro Matthew, PCP) ED Course Procedures Pt reassessed, feeling much better after fluids. Reports appetite returning, has more energy. UA reviewed, c/w possible infection, will send cx. Discussed with PCP, agreed possible dehydration, agreed with starting abx for possible UTI. TRINITY Mejía 
4:03 PM

## 2018-10-04 NOTE — PROGRESS NOTES
10/4/2018 
1:57 PM 
Case management note Met with patient and daughter to discuss discharge planning. Confirmed demographics. Patient lives in daughter home in a 2 story 4 steps to enter and 14 within. Patient performs ADL's, has no medical equipment and never used home health. [facility] [employer] needs. Patient follows [facility] for medical management. Patient has Advance Directive Reason for Admission:   Weakness, came from MD office RRAT Score:     14 Do you (patient/family) have any concerns for transition/discharge? no 
           
Plan for utilizing home health:     SNF vs [facility] Likelihood of readmission? Moderate/yellow Transition of Care Plan:      HH vs SNF Care Management Interventions PCP Verified by CM: Yes Mode of Transport at Discharge: Self Transition of Care Consult (CM Consult): Discharge Planning Current Support Network: Relative's Home Confirm Follow Up Transport: Family Plan discussed with Pt/Family/Caregiver: Yes Discharge Location Discharge Placement: Unable to determine at this time Aurea Bundy, [place] Vimal Harrison

## 2018-10-04 NOTE — MR AVS SNAPSHOT
[address] [phone] Patient: Nida Primrose MRN: [id number] THY:[id number][birth year]25/[id number] Visit Information Date & Time Provider Department Dept. Phone Encounter #  
 10/4/2018 11:00 AM Vesta Parekh 34 [id number] Upcoming Health Maintenance Date Due Shingrix Vaccine Age 50> (1 of 2) 9/16/1981 Pneumococcal 65+ Low/Medium Risk (2 of 2 - PPSV23) 3/17/2017 Influenza Age 5 to Adult 8/1/2018 MEDICARE YEARLY EXAM 3/23/2019 GLAUCOMA SCREENING Q2Y 2/21/2020 DTaP/Tdap/Td series (2 - Tdap) 5/15/2022 COLONOSCOPY 7/9/2023 Allergies as of 10/4/2018  Review Complete On: 10/4/2018 By: Jose Chin MD  
  
 Severity Noted Reaction Type Reactions Codeine  11/02/2017    Nausea Only Current Immunizations  Reviewed on 6/28/2018 Name Date DTaP 5/15/2012 Influenza Vaccine 10/15/2017 Not reviewed this visit You Were Diagnosed With   
  
 Codes Comments Chest pain, unspecified type    -  Primary ICD-10-CM: R07.9 ICD-9-CM: 786.50 Vitals BP Pulse Temp Resp Height(growth percentile) Weight(growth percentile) 149/66 72 97.8 °F (36.6 °C) (Oral) 18 5' 1\" (1.549 m) 112 lb (50.8 kg) SpO2 BMI OB Status Smoking Status 99% 21.16 kg/m2 Postmenopausal Never Smoker Vitals History BMI and BSA Data Body Mass Index Body Surface Area  
 21.16 kg/m 2 1.48 m 2 Preferred Pharmacy Pharmacy Name Phone [id number] [address] [facility] [phone] Your Updated Medication List  
  
   
This list is accurate as of 10/4/18 12:19 PM.  Always use your most recent med list.  
  
  
  
  
 CALCIUM 600 + D 600-125 mg-unit Tab Generic drug:  calcium-cholecalciferol (d3) Take 1 Tab by mouth.   
  
 OTHER  
 Take 2 Tabs by mouth daily. Indications: preservision areds We Performed the Following AMB POC EKG ROUTINE W/ 12 LEADS, INTER & REP [93852 CPT(R)] To-Do List   
 12/27/2018 2:00 PM  
  Appointment with SS INF2 CH4 <4H at [facility] ([phone]) Introducing [facility] & [facility] SERVICES! [employer] introduces Expa patient portal. Now you can access parts of your medical record, email your doctor's office, and request medication refills online. 1. In your internet browser, go to [email or web address] 2. Click on the First Time User? Click Here link in the Sign In box. You will see the New Member Sign Up page. 3. Enter your Expa Access Code exactly as it appears below. You will not need to use this code after youve completed the sign-up process. If you do not sign up before the expiration date, you must request a new code. · Expa Access Code: [email or web address] Expires: 1/2/2019 12:19 PM 
 
4. Enter the last four digits of your Social Security Number (xxxx) and Date of Birth (mm/dd/yyyy) as indicated and click Submit. You will be taken to the next sign-up page. 5. Create a Expa ID. This will be your Expa login ID and cannot be changed, so think of one that is secure and easy to remember. 6. Create a Expa password. You can change your password at any time. 7. Enter your Password Reset Question and Answer. This can be used at a later time if you forget your password. 8. Enter your e-mail address. You will receive e-mail notification when new information is available in [address] 9. Click Sign Up. You can now view and download portions of your medical record. 10. Click the Download Summary menu link to download a portable copy of your medical information. If you have questions, please visit the Frequently Asked Questions section of the Expa website. Remember, Expa is NOT to be used for urgent needs. For medical emergencies, dial 911. Now available from your iPhone and Android! Please provide this summary of care documentation to your next provider. Your primary care clinician is listed as Lilly Hunt. If you have any questions after today's visit, please call [phone].

## 2018-10-04 NOTE — ED NOTES
Pt states that she just used the restroom and can not provide sample at this time. PT aware of need for sample and will alert RN when she can provide one.

## 2018-10-04 NOTE — DISCHARGE INSTRUCTIONS
Fatigue: Care Instructions  Your Care Instructions    Fatigue is a feeling of tiredness, exhaustion, or lack of energy. You may feel fatigue because of too much or not enough activity. It can also come from stress, lack of sleep, boredom, and poor diet. Many medical problems, such as viral infections, can cause fatigue. Emotional problems, especially depression, are often the cause of fatigue. Fatigue is most often a symptom of another problem. Treatment for fatigue depends on the cause. For example, if you have fatigue because you have a certain health problem, treating this problem also treats your fatigue. If depression or anxiety is the cause, treatment may help. Follow-up care is a key part of your treatment and safety. Be sure to make and go to all appointments, and call your doctor if you are having problems. It's also a good idea to know your test results and keep a list of the medicines you take. How can you care for yourself at home? · Get regular exercise. But don't overdo it. Go back and forth between rest and exercise. · Get plenty of rest.  · Eat a healthy diet. Do not skip meals, especially breakfast.  · Reduce your use of caffeine, tobacco, and alcohol. Caffeine is most often found in coffee, tea, cola drinks, and chocolate. · Limit medicines that can cause fatigue. This includes tranquilizers and cold and allergy medicines. When should you call for help? Watch closely for changes in your health, and be sure to contact your doctor if:    · You have new symptoms such as fever or a rash.     · Your fatigue gets worse.     · You have been feeling down, depressed, or hopeless. Or you may have lost interest in things that you usually enjoy.     · You are not getting better as expected. Where can you learn more? Go to http://theresa-soto.info/. Enter E516 in the search box to learn more about \"Fatigue: Care Instructions. \"  Current as of: November 20, 2017  Content Version: 11.8  © 4891-0865 [employer]. Care instructions adapted under license by [employer] (which disclaims liability or warranty for this information). If you have questions about a medical condition or this instruction, always ask your healthcare professional. [address] any warranty or liability for your use of this information. Urinary Tract Infection in Women: Care Instructions  Your Care Instructions    A urinary tract infection, or UTI, is a general term for an infection anywhere between the kidneys and the urethra (where urine comes out). Most UTIs are bladder infections. They often cause pain or burning when you urinate. UTIs are caused by bacteria and can be cured with antibiotics. Be sure to complete your treatment so that the infection goes away. Follow-up care is a key part of your treatment and safety. Be sure to make and go to all appointments, and call your doctor if you are having problems. It's also a good idea to know your test results and keep a list of the medicines you take. How can you care for yourself at home? · Take your antibiotics as directed. Do not stop taking them just because you feel better. You need to take the full course of antibiotics. · Drink extra water and other fluids for the next day or two. This may help wash out the bacteria that are causing the infection. (If you have kidney, heart, or liver disease and have to limit fluids, talk with your doctor before you increase your fluid intake.)  · Avoid drinks that are carbonated or have caffeine. They can irritate the bladder. · Urinate often. Try to empty your bladder each time. · To relieve pain, take a hot bath or lay a heating pad set on low over your lower belly or genital area. Never go to sleep with a heating pad in place. To prevent UTIs  · Drink plenty of water each day. This helps you urinate often, which clears bacteria from your system.  (If you have kidney, heart, or liver disease and have to limit fluids, talk with your doctor before you increase your fluid intake.)  · Urinate when you need to. · Urinate right after you have sex. · Change sanitary pads often. · Avoid douches, bubble baths, feminine hygiene sprays, and other feminine hygiene products that have deodorants. · After going to the bathroom, wipe from front to back. When should you call for help? Call your doctor now or seek immediate medical care if:    · Symptoms such as fever, chills, nausea, or vomiting get worse or appear for the first time.     · You have new pain in your back just below your rib cage. This is called flank pain.     · There is new blood or pus in your urine.     · You have any problems with your antibiotic medicine.    Watch closely for changes in your health, and be sure to contact your doctor if:    · You are not getting better after taking an antibiotic for 2 days.     · Your symptoms go away but then come back. Where can you learn more? Go to http://theresa-soto.info/. Enter F712 in the search box to learn more about \"Urinary Tract Infection in Women: Care Instructions. \"  Current as of: March 21, 2018  Content Version: 11.8  © 4945-6411 [employer]. Care instructions adapted under license by [employer] (which disclaims liability or warranty for this information). If you have questions about a medical condition or this instruction, always ask your healthcare professional. [address] [address] any warranty or liability for your use of this information.

## 2018-10-04 NOTE — PROGRESS NOTES
HISTORY OF PRESENT ILLNESS  Teresa Mcwilliams is a 80 y.o. female. HPI Comments: Teresa Mcwilliams presents with her daughter today due to dizziness, generalized weakness, nausea and decreased appetite for the past 2 days. She states it feels like when she had \"walking pneumonia\" 2 years ago. Also, she has had constant chest heaviness since yesterday that has not changed. Nothing makes her worse or better. She has not tried anything for this. This am, she felt worse than yesterday. Due to the nausea, she has not been able to take any PO today. Review of Systems   Constitutional: Positive for malaise/fatigue. Negative for chills, diaphoresis and fever. Respiratory: Positive for shortness of breath. Negative for cough and wheezing. Cardiovascular: Negative for chest pain. Gastrointestinal: Positive for nausea. Negative for abdominal pain, blood in stool, constipation, diarrhea, heartburn, melena and vomiting. Genitourinary: Negative for dysuria, frequency and urgency. Neurological: Positive for dizziness, weakness and headaches. Visit Vitals    /66    Pulse 72    Temp 97.8 °F (36.6 °C) (Oral)    Resp 18    Ht 5' 1\" (1.549 m)    Wt 112 lb (50.8 kg)    SpO2 99%    BMI 21.16 kg/m2     Physical Exam   Constitutional: She is oriented to person, place, and time. She appears well-developed and well-nourished. No distress. Pale, moderately ill appearing   Cardiovascular: Normal rate, regular rhythm and normal heart sounds. Exam reveals no gallop and no friction rub. No murmur heard. Pulmonary/Chest: Effort normal and breath sounds normal. No respiratory distress. She has no wheezes. She has no rales. Abdominal: Soft. Normal appearance and bowel sounds are normal. She exhibits no distension. There is no hepatosplenomegaly. There is tenderness in the right upper quadrant and left upper quadrant. There is no rebound and no guarding. Musculoskeletal: She exhibits no edema. Neurological: She is alert and oriented to person, place, and time. Skin: Skin is warm and dry. She is not diaphoretic. Nursing note and vitals reviewed. EKG - NSR without STTW changes, normal intervals and axis, no hypertrophy     ASSESSMENT and PLAN    ICD-10-CM ICD-9-CM    1. Chest pain, unspecified type R07.9 786.50 AMB POC EKG ROUTINE W/ 12 LEADS, INTER & REP   2. Weakness R53.1 780.79    3. Dizziness R42 780.4    4. Nausea R11.0 787.02    5. At risk for dehydration due to poor fluid intake Z91.89 V15.89         Multiple non-specific symptoms that are concerning in an 80year old patient, at risk for dehydration  Patient sent directly to ER, case discussed with Hunter Jimenez ER PA    Follow-up Disposition:  Return per ER disposition. Reviewed plan of care. Patient and her daughter have provided input and agree with goals.

## 2018-10-04 NOTE — TELEPHONE ENCOUNTER
Phone call with Isabela Bruno in the ER who is seeing the patient. She received fluids and is looking better. Also, her UA is suspicious for a UTI. She plans to discharge her on Kelfex and Zofran and have her follow up with me.

## 2018-10-04 NOTE — PROGRESS NOTES
Chief Complaint   Patient presents with    Dizziness     weakness x 2 days    Nausea     and no appetite x 2 days     1. Have you been to the ER, urgent care clinic since your last visit? No  Hospitalized since your last visit? No     2. Have you seen or consulted any other health care providers outside of the [address] [place] [address] since your last visit? Include any pap smears or colon screening.  Yes GI

## 2018-10-05 ENCOUNTER — PATIENT OUTREACH (OUTPATIENT)
Dept: FAMILY MEDICINE CLINIC | Age: 83
End: 2018-10-05

## 2018-10-05 NOTE — PROGRESS NOTES
ED Discharge Follow-Up Date/Time:     10/5/2018 11:03 AM 
 
Patient presented to [facility]  ED on 10/5/18 and was diagnosed with UTI. Top Challenges reviewed with the provider Urine culture still pending 10/5 Method of communication with provider :none Nurse Navigator(NN) contacted the  family  by telephone to perform post ED discharge assessment. Verified name and  with family as identifiers. Provided introduction to self, and explanation of the Nurse Navigator role. Family reported assessment: Patient feeling much better after receiving fluids and ABT at ED Medication(s):  
New Medications at Discharge:cephALEXin (KEFLEX) 500 mg, ondansetron (ZOFRAN ODT) 4 mg Changed Medications at Discharge: none Discontinued Medications at Discharge:None There were no barriers to obtaining medications identified at this time. Reviewed discharge instructions and red flags with  patient who voiced understanding. Patient given an opportunity to ask questions and does not have any further questions or concerns at this time. The family agrees to contact the PCP office for questions related to their healthcare. Patient reminded that there are physicians on call 24 hours a day / 7 days a week (M-F 5pm to 8am and from Friday 5pm until Monday 8am for the weekend) should the patient have questions or concerns. NN provided contact information for future reference. Offered follow up appointment with PCP: yes [facility] follow up appointment(s): Future Appointments Date Time Provider Hi Alberts 2018 2:00 PM SS INF2 CH4 <4H RCHICJALEESA Galeana Non-[facility] follow up appointment(s): NA 
[employer]:  n/a  
 www. [facility] 9 AM- 9 PM.   Phone [phone] Goals  Prevent complications post hospitalization. 10/5/18- Patient will follow up with physician.  Patient will take all medications as prescribed and watch for any signs or symptoms associated with DX. Will follow up 2 weeks-CDT

## 2018-10-06 LAB
BACTERIA SPEC CULT: NORMAL
CC UR VC: NORMAL
SERVICE CMNT-IMP: NORMAL

## 2018-10-08 DIAGNOSIS — E55.9 VITAMIN D DEFICIENCY: Primary | ICD-10-CM

## 2018-10-08 DIAGNOSIS — E55.9 VITAMIN D DEFICIENCY: ICD-10-CM

## 2018-10-09 LAB
BACTERIA SPEC CULT: NORMAL
SERVICE CMNT-IMP: NORMAL

## 2018-10-11 ENCOUNTER — OFFICE VISIT (OUTPATIENT)
Dept: FAMILY MEDICINE CLINIC | Age: 83
End: 2018-10-11

## 2018-10-11 VITALS
BODY MASS INDEX: 21.34 KG/M2 | DIASTOLIC BLOOD PRESSURE: 55 MMHG | RESPIRATION RATE: 18 BRPM | HEART RATE: 71 BPM | SYSTOLIC BLOOD PRESSURE: 121 MMHG | HEIGHT: 61 IN | WEIGHT: 113 LBS | TEMPERATURE: 98.3 F

## 2018-10-11 DIAGNOSIS — E86.0 DEHYDRATION: Primary | ICD-10-CM

## 2018-10-11 RX ORDER — ERGOCALCIFEROL 1.25 MG/1
CAPSULE ORAL
Qty: 24 CAP | Refills: 0 | OUTPATIENT
Start: 2018-10-11

## 2018-10-11 NOTE — PROGRESS NOTES
Chief Complaint   Patient presents with   [facility] Follow Up     UTI; [facility]; 10/04/18     1. Have you been to the ER, urgent care clinic since your last visit? Yes 10/04/18 for UTI and dehydration  Hospitalized since your last visit? No     2. Have you seen or consulted any other health care providers outside of the [address] [place] [address] since your last visit? Include any pap smears or colon screening.  No

## 2018-10-11 NOTE — MR AVS SNAPSHOT
[address] 
[phone] Patient: Linda Robertson MRN: [id number] YHS:[birth year] Visit Information Date & Time Provider Department Dept. Phone Encounter #  
 10/11/2018 11:00 AM Conrad Guzmandede 34 [id number] Upcoming Health Maintenance Date Due Shingrix Vaccine Age 50> (1 of 2) 9/16/1981 Pneumococcal 65+ Low/Medium Risk (2 of 2 - PPSV23) 3/17/2017 Influenza Age 5 to Adult 8/1/2018 MEDICARE YEARLY EXAM 3/23/2019 GLAUCOMA SCREENING Q2Y 2/21/2020 DTaP/Tdap/Td series (2 - Tdap) 5/15/2022 COLONOSCOPY 7/9/2023 Allergies as of 10/11/2018  Review Complete On: 10/11/2018 By: Arin Wise MD  
  
 Severity Noted Reaction Type Reactions Codeine  11/02/2017    Nausea Only Current Immunizations  Reviewed on 10/11/2018 Name Date DTaP 5/15/2012 HPV 10/11/2018 Influenza Vaccine 10/15/2017 Reviewed by Baldo Gaston LPN on 84/85/1058 at 11:49 AM  
You Were Diagnosed With   
  
 Codes Comments Urinary tract infection without hematuria, site unspecified    -  Primary ICD-10-CM: N39.0 ICD-9-CM: 599.0 Vitals BP Pulse Temp Resp Height(growth percentile) Weight(growth percentile) 121/55 71 98.3 °F (36.8 °C) (Oral) 18 5' 1\" (1.549 m) 113 lb (51.3 kg) BMI OB Status Smoking Status 21.35 kg/m2 Postmenopausal Never Smoker Vitals History BMI and BSA Data Body Mass Index Body Surface Area  
 21.35 kg/m 2 1.49 m 2 Preferred Pharmacy Pharmacy Name Phone [id number] [address] [facility] [phone] Your Updated Medication List  
  
   
This list is accurate as of 10/11/18 12:08 PM.  Always use your most recent med list.  
  
  
  
  
 CALCIUM 600 + D 600-125 mg-unit Tab Generic drug:  calcium-cholecalciferol (d3) Take 1 Tab by mouth. FLUZONE HIGH-DOSE 2018-19 (PF) Syrg injection Generic drug:  influenza vaccine 2018-19 (65 yrs+)(PF)  
TO BE ADMINISTERED BY PHARMACIST FOR IMMUNIZATION  
  
 OTHER Take 2 Tabs by mouth daily. Indications: preservision areds To-Do List   
 12/27/2018 2:00 PM  
  Appointment with SS INF2 CH4 <4H at [facility] 73 ([phone]) Introducing [facility] & [facility] SERVICES! [facility] introduces [employer] patient portal. Now you can access parts of your medical record, email your doctor's office, and request medication refills online. 1. In your internet browser, go to [email or web address] 2. Click on the First Time User? Click Here link in the Sign In box. You will see the New Member Sign Up page. 3. Enter your [employer] Access Code exactly as it appears below. You will not need to use this code after youve completed the sign-up process. If you do not sign up before the expiration date, you must request a new code. · [employer] Access Code: [email or web address] Expires: 1/2/2019 12:19 PM 
 
4. Enter the last four digits of your Social Security Number (xxxx) and Date of Birth (mm/dd/yyyy) as indicated and click Submit. You will be taken to the next sign-up page. 5. Create a [employer] ID. This will be your [employer] login ID and cannot be changed, so think of one that is secure and easy to remember. 6. Create a [employer] password. You can change your password at any time. 7. Enter your Password Reset Question and Answer. This can be used at a later time if you forget your password. 8. Enter your e-mail address. You will receive e-mail notification when new information is available in [address] 9. Click Sign Up. You can now view and download portions of your medical record. 10. Click the Download Summary menu link to download a portable copy of your medical information.  
 
If you have questions, please visit the Frequently Asked Questions section of the [employer]. Remember, [employer] is NOT to be used for urgent needs. For medical emergencies, dial 911. Now available from your iPhone and Android! Please provide this summary of care documentation to your next provider. Your primary care clinician is listed as Rachel De Jesus. If you have any questions after today's visit, please call [phone].

## 2018-10-12 LAB — 25(OH)D3+25(OH)D2 SERPL-MCNC: 56.2 NG/ML (ref 30–100)

## 2018-10-12 RX ORDER — ASPIRIN 325 MG
50000 TABLET, DELAYED RELEASE (ENTERIC COATED) ORAL
Qty: 12 CAP | Refills: 3 | Status: SHIPPED | OUTPATIENT
Start: 2018-10-12 | End: 2018-10-17 | Stop reason: ALTCHOICE

## 2018-10-12 NOTE — TELEPHONE ENCOUNTER
Please call patient and let her know I did not refill her vitamin D because she needs a level drawn to determine whether or not she needs prescription vitamin D. She should have a lab slip.

## 2018-10-12 NOTE — TELEPHONE ENCOUNTER
Please call patient and let her know her vitamin D is normal.  I would like for her to take 50,000 units once a week to keep it that way. A prescription has been sent to her pharmacy.

## 2018-10-15 ENCOUNTER — TELEPHONE (OUTPATIENT)
Dept: FAMILY MEDICINE CLINIC | Age: 83
End: 2018-10-15

## 2018-10-15 DIAGNOSIS — E55.9 VITAMIN D DEFICIENCY: Primary | ICD-10-CM

## 2018-10-15 NOTE — TELEPHONE ENCOUNTER
----- Message from [employer] [address] [address] sent at 10/15/2018 12:20 PM EDT -----  Regarding: Dr. Uri Lyon  Pt's daughter, Jo-Ann Figueroa, returned call from [place]. She advised that it is okay to leave a voicemail because she is at work. . Best contact number is [phone].

## 2018-10-15 NOTE — TELEPHONE ENCOUNTER
Called pt's daughter and left a voice message, asking that she call the office back in regard to pt.

## 2018-10-15 NOTE — TELEPHONE ENCOUNTER
Called and spoke with pt's daughter, and she has been advised per Dr. Deja Stinson of Vitamin D level and that a RX to take vitamin d weekly has been sent to local pharmacy. Pt's daughter asks can be restart her Prolia injections.

## 2018-10-17 RX ORDER — ACETAMINOPHEN 500 MG
2000 TABLET ORAL DAILY
COMMUNITY
End: 2020-06-22

## 2018-10-17 NOTE — TELEPHONE ENCOUNTER
Please call patient and her daughter and let them know her vitamin D is now normal so she can start Prolia. I would like for her to start vitamin D3, 2000 units daily to keep it that way. This is available over the counter.

## 2018-10-18 ENCOUNTER — TELEPHONE (OUTPATIENT)
Dept: FAMILY MEDICINE CLINIC | Age: 83
End: 2018-10-18

## 2018-10-18 NOTE — TELEPHONE ENCOUNTER
Called pt's daughter, and left a voice message, that pt is able to restart her injections, and we will fax over order form. Advised pt's daughter to call office back with questions or concerns.

## 2018-10-19 ENCOUNTER — PATIENT OUTREACH (OUTPATIENT)
Dept: FAMILY MEDICINE CLINIC | Age: 83
End: 2018-10-19

## 2018-10-19 NOTE — PROGRESS NOTES
Follow up call placed to patient. Patient presented to [facility]  ED on 10/5/18 and was diagnosed with UTI.  
 
No answer at this time, Message left requesting return call.

## 2018-10-24 ENCOUNTER — HOSPITAL ENCOUNTER (OUTPATIENT)
Dept: INFUSION THERAPY | Age: 83
Discharge: HOME | End: 2018-10-24
Payer: MEDICARE

## 2018-10-24 VITALS
TEMPERATURE: 96.9 F | DIASTOLIC BLOOD PRESSURE: 67 MMHG | HEART RATE: 72 BPM | RESPIRATION RATE: 18 BRPM | SYSTOLIC BLOOD PRESSURE: 160 MMHG

## 2018-10-24 PROCEDURE — 74011250636 HC RX REV CODE- 250/636: Performed by: FAMILY MEDICINE

## 2018-10-24 PROCEDURE — 96372 THER/PROPH/DIAG INJ SC/IM: CPT

## 2018-10-24 RX ADMIN — DENOSUMAB 60 MG: 60 INJECTION SUBCUTANEOUS at 15:35

## 2018-10-25 NOTE — PROGRESS NOTES
Outpatient Infusion Center Short Visit Progress Note 1520 Patient admitted to [facility] for Prolia ambulatory in stable condition. Assessment completed. No new concerns voiced. Labs done on 10/4/18 and reviewed with Maddison Martinez. Visit Vitals /67 Pulse 72 Temp 96.9 °F (36.1 °C) Resp 18 Medications: 
SQ Prolia 1540 Patient tolerated treatment well. Patient discharged from [place] [address] ambulatory in no distress. Patient aware of next appointment scheduled for 4/25 @ 1330.

## 2018-11-02 ENCOUNTER — PATIENT OUTREACH (OUTPATIENT)
Dept: FAMILY MEDICINE CLINIC | Age: 83
End: 2018-11-02

## 2018-11-02 NOTE — LETTER
11/2/2018 12:19 PM 
 
Ms. [address] [facility] 
[address] [id number] Care management goals have been completed at this time. No further nurse navigator follow up scheduled. Pt has nurse navigator's contact information for any further questions, concerns, or needs. Patients upcoming visits:   
Future Appointments Date Time Provider Hi Alberts 4/25/2019  1:30 PM SS INF1 CH3 <4H [facility] Cornelius Delgadillo Sincerely, Loel Osler, LPN

## 2018-11-02 NOTE — PROGRESS NOTES
Patient has graduated from the Transitions of Care Coordination  program on 11/2/18. Patient's symptoms are stable at this time. Patient/family has the ability to self-manage. Care management goals have been completed at this time. No further nurse navigator follow up scheduled. Goals Addressed This Visit's Progress  COMPLETED: Prevent complications post hospitalization. 10/5/18- Patient will follow up with physician. Patient will take all medications as prescribed and watch for any signs or symptoms associated with DX. Will follow up 2 weeks-CDT Pt has nurse navigator's contact information for any further questions, concerns, or needs. Patients upcoming visits:   
Future Appointments Date Time Provider Hi Alberts 4/25/2019  1:30 PM SS INF1 CH3 <4H ALFIE Shultz

## 2019-04-25 ENCOUNTER — HOSPITAL ENCOUNTER (OUTPATIENT)
Dept: INFUSION THERAPY | Age: 84
Discharge: HOME | End: 2019-04-25
Payer: MEDICARE

## 2019-04-25 VITALS
HEART RATE: 75 BPM | TEMPERATURE: 97.9 F | RESPIRATION RATE: 16 BRPM | DIASTOLIC BLOOD PRESSURE: 62 MMHG | SYSTOLIC BLOOD PRESSURE: 134 MMHG

## 2019-04-25 LAB
ANION GAP BLD CALC-SCNC: 16 MMOL/L (ref 10–20)
BUN BLD-MCNC: 15 MG/DL (ref 9–20)
CA-I BLD-MCNC: 1.14 MMOL/L (ref 1.12–1.32)
CHLORIDE BLD-SCNC: 101 MMOL/L (ref 98–107)
CO2 BLD-SCNC: 27 MMOL/L (ref 21–32)
CREAT BLD-MCNC: 0.8 MG/DL (ref 0.6–1.3)
GLUCOSE BLD-MCNC: 128 MG/DL (ref 65–100)
HCT VFR BLD CALC: 34 % (ref 35–47)
MAGNESIUM SERPL-MCNC: 2.2 MG/DL (ref 1.6–2.4)
PHOSPHATE SERPL-MCNC: 3.4 MG/DL (ref 2.6–4.7)
POTASSIUM BLD-SCNC: 3.9 MMOL/L (ref 3.5–5.1)
SERVICE CMNT-IMP: ABNORMAL
SODIUM BLD-SCNC: 140 MMOL/L (ref 136–145)

## 2019-04-25 PROCEDURE — 84100 ASSAY OF PHOSPHORUS: CPT

## 2019-04-25 PROCEDURE — 80048 BASIC METABOLIC PNL TOTAL CA: CPT

## 2019-04-25 PROCEDURE — 80047 BASIC METABLC PNL IONIZED CA: CPT

## 2019-04-25 PROCEDURE — 74011250636 HC RX REV CODE- 250/636: Performed by: FAMILY MEDICINE

## 2019-04-25 PROCEDURE — 96372 THER/PROPH/DIAG INJ SC/IM: CPT

## 2019-04-25 PROCEDURE — 83735 ASSAY OF MAGNESIUM: CPT

## 2019-04-25 PROCEDURE — 36415 COLL VENOUS BLD VENIPUNCTURE: CPT

## 2019-04-25 RX ADMIN — DENOSUMAB 60 MG: 60 INJECTION SUBCUTANEOUS at 13:55

## 2019-04-25 NOTE — PROGRESS NOTES
[facility]patient [facility]fusion [facility] Short Visit Progress Note Patient admitted to [facility] for Prolia ambulatory in stable condition. Assessment completed. No new concerns voiced. Patient Vitals for the past 12 hrs: 
 Temp Pulse Resp BP  
04/25/19 1335 97.9 °F (36.6 °C) 75 16 134/62 Recent Results (from the past 12 hour(s)) POC CHEM8 Collection Time: 04/25/19  1:46 PM  
Result Value Ref Range Calcium, ionized (POC) 1.14 1.12 - 1.32 mmol/L Sodium (POC) 140 136 - 145 mmol/L Potassium (POC) 3.9 3.5 - 5.1 mmol/L Chloride (POC) 101 98 - 107 mmol/L  
 CO2 (POC) 27 21 - 32 mmol/L Anion gap (POC) 16 10 - 20 mmol/L Glucose (POC) 128 (H) 65 - 100 mg/dL BUN (POC) 15 9 - 20 mg/dL Creatinine (POC) 0.8 0.6 - 1.3 mg/dL GFRAA, POC >60 >60 ml/min/1.73m2 GFRNA, POC >60 >60 ml/min/1.73m2 Hematocrit (POC) 34 (L) 35.0 - 47.0 % Comment Comment Not Indicated. Left arm with positive blood return. NO dental work scheduled. Medications: 
Prolia SQ Patient tolerated treatment well. Patient discharged from [place] [id number] ambulatory in no distress. Patient aware of next appointment scheduled for 10/24/19.  
 
Shelby Qureshi RN

## 2019-09-03 NOTE — PROGRESS NOTES
This is the Subsequent Medicare Annual Wellness Exam, performed 12 months or more after the Initial AWV or the last Subsequent AWV    I have reviewed the patient's medical history in detail and updated the computerized patient record. Her daughter is with her today. History     Past Medical History:   Diagnosis Date    ACP (advance care planning) 3/22/2018    The patient brought in her signed advance directive today (3/22/18).  Joint pain     Osteoarthritis     Osteoporosis     Schatzki's ring     Vitamin D deficiency 7/8/2018      Past Surgical History:   Procedure Laterality Date    COLONOSCOPY N/A 7/5/2018    COLONOSCOPY (LATEX ALLERGY) performed by José Luis Pascual [facility] AM[facility]    HX COLONOSCOPY      HX GI      Schatzkis ring dilitation    HX GYN      3 vaginal births    HX HEENT      bilateral cataracts    HX OTHER SURGICAL      Schatzki Ring Dilation     Current Outpatient Medications   Medication Sig Dispense Refill    Cholecalciferol, Vitamin D3, (VITAMIN D3) 2,000 unit cap capsule Take 2,000 Units by mouth daily.  FLUZONE HIGH-DOSE 2018-19, PF, syrg injection TO BE ADMINISTERED BY PHARMACIST FOR IMMUNIZATION  0    OTHER Take 2 Tabs by mouth daily. Indications: preservision areds      calcium-cholecalciferol, d3, (CALCIUM 600 + D) 600-125 mg-unit tab Take 1 Tab by mouth.        Allergies   Allergen Reactions    Codeine Nausea Only     Family History   Problem Relation Age of Onset    Hypertension Mother     Stroke Father     Lung Disease Brother    [facility] Cancer Brother         lung    Crohn's Disease Daughter     Thyroid Disease Daughter     Diabetes Son      Social History     Tobacco Use    Smoking status: Never Smoker    Smokeless tobacco: Never Used   Substance Use Topics    Alcohol use: No     Patient Active Problem List   Diagnosis Code    Osteoarthritis M19.90    Age-related osteoporosis without current pathological fracture M81.0    ACP (advance care planning) Z71.89    Schatzki's ring K22.2    Vitamin D deficiency E55.9       Depression Risk Factor Screening:     3 most recent PHQ Screens 9/5/2019   Little interest or pleasure in doing things Not at all   Feeling down, depressed, irritable, or hopeless Not at all   Total Score PHQ 2 0     Alcohol Risk Factor Screening: You do not drink alcohol or very rarely. Functional Ability and Level of Safety:   Hearing Loss  Mild hearing loss    Activities of Daily Living  The home contains: walker  Patient needs help with:  transportation, managing medications and managing money    Fall Risk  Fall Risk Assessment, last 12 mths 9/5/2019   Able to walk? Yes   Fall in past 12 months? No       Abuse Screen  Patient is not abused    Cognitive Screening   Evaluation of Cognitive Function:  Has your family/caregiver stated any concerns about your memory: yes  Normal, Clock Drawing test    Patient Care Team   Patient Care Team:  Jatinder May MD as PCP - General (Family Practice)  Fabian Shields MD (Ophthalmology)    Visit Vitals  /70   Pulse 85   Temp 98.1 °F (36.7 °C) (Oral)   Resp 18   Ht 5' 1\" (1.549 m)   Wt 131 lb (59.4 kg)   BMI 24.75 kg/m²     General appearance - alert, well appearing, and in no distress  Heart - normal rate, regular rhythm, normal S1, S2, no murmurs, rubs, clicks or gallops   Chest - clear to auscultation, no wheezes, rales or rhonchi, symmetric air entry   Ext-no pedal edema noted        Assessment/Plan   Education and counseling provided:  Are appropriate based on today's review and evaluation  Advance Care Plan or Surrogate Decision Maker documented in medical record. Diagnoses and all orders for this visit:    1. Encounter for immunization  -     varicella-zoster recombinant, PF, (SHINGRIX, PF,) 50 mcg/0.5 mL susr injection; 0.5 mL by IntraMUSCular route once for 1 dose.   -     pneumococcal 23-valent (PNEUMOVAX 23) 25 mcg/0.5 mL injection; 0.5 mL by IntraMUSCular route once for 1 dose. 2. Medicare annual wellness visit, subsequent    3. Menopause  -     DEXA BONE DENSITY STUDY AXIAL; Future    4. Age-related osteoporosis without current pathological fracture  -     denosumab (PROLIA) 60 mg/mL injection; Prolia 60 mg/mL subcutaneous syringe x 1    5. Vitamin D deficiency  -     VITAMIN D, 25 HYDROXY    6. Hypercholesterolemia  -     LIPID PANEL  -     HEPATIC FUNCTION PANEL    7. Screening for depression  -     DEPRESSION SCREEN ANNUAL        Follow-up and Dispositions    · Return in about 1 year (around 9/5/2020) for AWV. Reviewed plan of care. Patient has provided input and agrees with goals.             Health Maintenance Due   Topic Date Due    Shingrix Vaccine Age 49> (1 of 2) 09/16/1981    Pneumococcal 65+ years (2 of 2 - PPSV23) 09/16/1996    MEDICARE YEARLY EXAM  03/23/2019    Influenza Age 9 to Adult  08/01/2019

## 2019-09-03 NOTE — PATIENT INSTRUCTIONS
Medicare Wellness Visit, Female     The best way to live healthy is to have a lifestyle where you eat a well-balanced diet, exercise regularly, limit alcohol use, and quit all forms of tobacco/nicotine, if applicable. Regular preventive services are another way to keep healthy. Preventive services (vaccines, screening tests, monitoring & exams) can help personalize your care plan, which helps you manage your own care. Screening tests can find health problems at the earliest stages, when they are easiest to treat. [facility] follows the current, evidence-based guidelines published by the [facility] ([facility]) when recommending preventive services for our patients. Because we follow these guidelines, sometimes recommendations change over time as research supports it. (For example, mammograms used to be recommended annually. Even though Medicare will still pay for an annual mammogram, the newer guidelines recommend a mammogram every two years for women of average risk.)  Of course, you and your doctor may decide to screen more often for some diseases, based on your risk and your health status. Preventive services for you include:  - Medicare offers their members a free annual wellness visit, which is time for you and your primary care provider to discuss and plan for your preventive service needs. Take advantage of this benefit every year!  -All adults over the age of 72 should receive the recommended pneumonia vaccines. Current USPSTF guidelines recommend a series of two vaccines for the best pneumonia protection.   -All adults should have a flu vaccine yearly and a tetanus vaccine every 10 years. All adults age 61 and older should receive a shingles vaccine once in their lifetime.    -A bone mass density test is recommended when a woman turns 65 to screen for osteoporosis. This test is only recommended one time, as a screening.  Some providers will use this same test as a disease monitoring tool if you already have osteoporosis. -All adults age 38-68 who are overweight should have a diabetes screening test once every three years.   -Other screening tests and preventive services for persons with diabetes include: an eye exam to screen for diabetic retinopathy, a kidney function test, a foot exam, and stricter control over your cholesterol.   -Cardiovascular screening for adults with routine risk involves an electrocardiogram (ECG) at intervals determined by your doctor.   -Colorectal cancer screenings should be done for adults age 54-65 with no increased risk factors for colorectal cancer. There are a number of acceptable methods of screening for this type of cancer. Each test has its own benefits and drawbacks. Discuss with your doctor what is most appropriate for you during your annual wellness visit. The different tests include: colonoscopy (considered the best screening method), a fecal occult blood test, a fecal DNA test, and sigmoidoscopy. -Breast cancer screenings are recommended every other year for women of normal risk, age 54-69.  -Cervical cancer screenings for women over age 72 are only recommended with certain risk factors.   -All adults born between [facility] should be screened once for Hepatitis C. Here is a list of your current Health Maintenance items (your personalized list of preventive services) with a due date:  Health Maintenance Due   Topic Date Due    Shingles Vaccine (1 of 2) 09/16/1981    Pneumococcal Vaccine (2 of 2 - PPSV23) 09/16/1996    Annual Well Visit  03/23/2019    Flu Vaccine  08/01/2019         Medicare Wellness Visit, Female     The best way to live healthy is to have a lifestyle where you eat a well-balanced diet, exercise regularly, limit alcohol use, and quit all forms of tobacco/nicotine, if applicable. Regular preventive services are another way to keep healthy.  Preventive services (vaccines, screening tests, monitoring & exams) can help personalize your care plan, which helps you manage your own care. Screening tests can find health problems at the earliest stages, when they are easiest to treat. [facility] follows the current, evidence-based guidelines published by the [facility] ([facility]) when recommending preventive services for our patients. Because we follow these guidelines, sometimes recommendations change over time as research supports it. (For example, mammograms used to be recommended annually. Even though Medicare will still pay for an annual mammogram, the newer guidelines recommend a mammogram every two years for women of average risk.)  Of course, you and your doctor may decide to screen more often for some diseases, based on your risk and your health status. Preventive services for you include:  - Medicare offers their members a free annual wellness visit, which is time for you and your primary care provider to discuss and plan for your preventive service needs. Take advantage of this benefit every year!  -All adults over the age of 72 should receive the recommended pneumonia vaccines. Current USPSTF guidelines recommend a series of two vaccines for the best pneumonia protection.   -All adults should have a flu vaccine yearly and a tetanus vaccine every 10 years. All adults age 61 and older should receive a shingles vaccine once in their lifetime.    -A bone mass density test is recommended when a woman turns 65 to screen for osteoporosis. This test is only recommended one time, as a screening. Some providers will use this same test as a disease monitoring tool if you already have osteoporosis.   -All adults age 38-68 who are overweight should have a diabetes screening test once every three years.   -Other screening tests and preventive services for persons with diabetes include: an eye exam to screen for diabetic retinopathy, a kidney function test, a foot exam, and stricter control over your cholesterol.   -Cardiovascular screening for adults with routine risk involves an electrocardiogram (ECG) at intervals determined by your doctor.   -Colorectal cancer screenings should be done for adults age 54-65 with no increased risk factors for colorectal cancer. There are a number of acceptable methods of screening for this type of cancer. Each test has its own benefits and drawbacks. Discuss with your doctor what is most appropriate for you during your annual wellness visit. The different tests include: colonoscopy (considered the best screening method), a fecal occult blood test, a fecal DNA test, and sigmoidoscopy. -Breast cancer screenings are recommended every other year for women of normal risk, age 54-69.  -Cervical cancer screenings for women over age 72 are only recommended with certain risk factors.   -All adults born between [facility] should be screened once for Hepatitis C.      Here is a list of your current Health Maintenance items (your personalized list of preventive services) with a due date:  Health Maintenance Due   Topic Date Due    Shingles Vaccine (1 of 2) 09/16/1981    Pneumococcal Vaccine (2 of 2 - PPSV23) 09/16/1996    Flu Vaccine  08/01/2019

## 2019-09-05 ENCOUNTER — OFFICE VISIT (OUTPATIENT)
Dept: FAMILY MEDICINE CLINIC | Age: 84
End: 2019-09-05

## 2019-09-05 VITALS
SYSTOLIC BLOOD PRESSURE: 143 MMHG | HEART RATE: 85 BPM | TEMPERATURE: 98.1 F | RESPIRATION RATE: 18 BRPM | BODY MASS INDEX: 24.73 KG/M2 | WEIGHT: 131 LBS | HEIGHT: 61 IN | DIASTOLIC BLOOD PRESSURE: 70 MMHG

## 2019-09-05 DIAGNOSIS — Z23 ENCOUNTER FOR IMMUNIZATION: Primary | ICD-10-CM

## 2019-09-05 DIAGNOSIS — Z78.0 MENOPAUSE: ICD-10-CM

## 2019-09-05 DIAGNOSIS — Z13.31 SCREENING FOR DEPRESSION: ICD-10-CM

## 2019-09-05 DIAGNOSIS — M81.0 AGE-RELATED OSTEOPOROSIS WITHOUT CURRENT PATHOLOGICAL FRACTURE: ICD-10-CM

## 2019-09-05 DIAGNOSIS — E78.00 HYPERCHOLESTEROLEMIA: ICD-10-CM

## 2019-09-05 DIAGNOSIS — E55.9 VITAMIN D DEFICIENCY: ICD-10-CM

## 2019-09-05 DIAGNOSIS — Z00.00 MEDICARE ANNUAL WELLNESS VISIT, SUBSEQUENT: ICD-10-CM

## 2019-09-05 NOTE — PROGRESS NOTES
Chief Complaint   Patient presents with    Annual Wellness Visit   Pt's daughter is inquiring about a bone density scan. Needs new order for Prolia injection    1. Have you been to the ER, urgent care clinic since your last visit? Hospitalized since your last visit? No     2. Have you seen or consulted any other health care providers outside of the [address] [place] [address] since your last visit? Include any pap smears or colon screening.  No

## 2019-09-08 DIAGNOSIS — E55.9 VITAMIN D DEFICIENCY: ICD-10-CM

## 2019-09-09 RX ORDER — ASPIRIN 325 MG
50000 TABLET, DELAYED RELEASE (ENTERIC COATED) ORAL
Qty: 12 CAP | Refills: 0 | Status: SHIPPED | OUTPATIENT
Start: 2019-09-09 | End: 2019-11-26

## 2019-09-12 LAB
25(OH)D3+25(OH)D2 SERPL-MCNC: 71.1 NG/ML (ref 30–100)
ALBUMIN SERPL-MCNC: 4.2 G/DL (ref 3.5–4.7)
ALP SERPL-CCNC: 42 IU/L (ref 39–117)
ALT SERPL-CCNC: 12 IU/L (ref 0–32)
AST SERPL-CCNC: 22 IU/L (ref 0–40)
BILIRUB DIRECT SERPL-MCNC: 0.11 MG/DL (ref 0–0.4)
BILIRUB SERPL-MCNC: 0.4 MG/DL (ref 0–1.2)
CHOLEST SERPL-MCNC: 239 MG/DL (ref 100–199)
HDLC SERPL-MCNC: 48 MG/DL
INTERPRETATION, 910389: NORMAL
LDLC SERPL CALC-MCNC: 156 MG/DL (ref 0–99)
PROT SERPL-MCNC: 6.1 G/DL (ref 6–8.5)
TRIGL SERPL-MCNC: 173 MG/DL (ref 0–149)
VLDLC SERPL CALC-MCNC: 35 MG/DL (ref 5–40)

## 2019-10-03 ENCOUNTER — HOSPITAL ENCOUNTER (OUTPATIENT)
Dept: MAMMOGRAPHY | Age: 84
Discharge: HOME | End: 2019-10-03
Attending: FAMILY MEDICINE
Payer: MEDICARE

## 2019-10-03 DIAGNOSIS — Z78.0 MENOPAUSE: ICD-10-CM

## 2019-10-03 PROCEDURE — 77080 DXA BONE DENSITY AXIAL: CPT

## 2019-10-23 ENCOUNTER — TELEPHONE (OUTPATIENT)
Dept: FAMILY MEDICINE CLINIC | Age: 84
End: 2019-10-23

## 2019-10-23 NOTE — TELEPHONE ENCOUNTER
John Paul with [facility] called requesting a new order for Prolia. States patient has an appointment tomorrow, 10/24/19. Fax  [phone] can be reached at [phone] if you need to speak with her.

## 2019-10-24 ENCOUNTER — HOSPITAL ENCOUNTER (OUTPATIENT)
Dept: INFUSION THERAPY | Age: 84
Discharge: HOME | End: 2019-10-24
Payer: MEDICARE

## 2019-10-24 VITALS
DIASTOLIC BLOOD PRESSURE: 78 MMHG | HEART RATE: 83 BPM | SYSTOLIC BLOOD PRESSURE: 132 MMHG | TEMPERATURE: 97.6 F | RESPIRATION RATE: 16 BRPM

## 2019-10-24 LAB
ANION GAP BLD CALC-SCNC: 12 MMOL/L (ref 10–20)
BUN BLD-MCNC: 17 MG/DL (ref 9–20)
CA-I BLD-MCNC: 1.2 MMOL/L (ref 1.12–1.32)
CHLORIDE BLD-SCNC: 100 MMOL/L (ref 98–107)
CO2 BLD-SCNC: 32 MMOL/L (ref 21–32)
CREAT BLD-MCNC: 1 MG/DL (ref 0.6–1.3)
GLUCOSE BLD-MCNC: 129 MG/DL (ref 65–100)
HCT VFR BLD CALC: 38 % (ref 35–47)
MAGNESIUM SERPL-MCNC: 2.3 MG/DL (ref 1.6–2.4)
PHOSPHATE SERPL-MCNC: 3.3 MG/DL (ref 2.6–4.7)
POTASSIUM BLD-SCNC: 3.7 MMOL/L (ref 3.5–5.1)
SERVICE CMNT-IMP: ABNORMAL
SODIUM BLD-SCNC: 140 MMOL/L (ref 136–145)

## 2019-10-24 PROCEDURE — 84100 ASSAY OF PHOSPHORUS: CPT

## 2019-10-24 PROCEDURE — 83735 ASSAY OF MAGNESIUM: CPT

## 2019-10-24 PROCEDURE — 80047 BASIC METABLC PNL IONIZED CA: CPT

## 2019-10-24 PROCEDURE — 74011250636 HC RX REV CODE- 250/636: Performed by: FAMILY MEDICINE

## 2019-10-24 PROCEDURE — 96372 THER/PROPH/DIAG INJ SC/IM: CPT

## 2019-10-24 PROCEDURE — 36415 COLL VENOUS BLD VENIPUNCTURE: CPT

## 2019-10-24 RX ADMIN — DENOSUMAB 60 MG: 60 INJECTION SUBCUTANEOUS at 13:58

## 2019-10-31 ENCOUNTER — OFFICE VISIT (OUTPATIENT)
Dept: FAMILY MEDICINE CLINIC | Age: 84
End: 2019-10-31

## 2019-10-31 VITALS
SYSTOLIC BLOOD PRESSURE: 122 MMHG | TEMPERATURE: 96.2 F | BODY MASS INDEX: 24.92 KG/M2 | HEIGHT: 61 IN | RESPIRATION RATE: 18 BRPM | WEIGHT: 132 LBS | HEART RATE: 100 BPM | DIASTOLIC BLOOD PRESSURE: 50 MMHG

## 2019-10-31 DIAGNOSIS — M85.89 OSTEOPENIA OF MULTIPLE SITES: ICD-10-CM

## 2019-10-31 DIAGNOSIS — E78.00 HYPERCHOLESTEROLEMIA: Primary | ICD-10-CM

## 2019-10-31 DIAGNOSIS — E55.9 VITAMIN D DEFICIENCY: ICD-10-CM

## 2019-10-31 RX ORDER — PRAVASTATIN SODIUM 20 MG/1
20 TABLET ORAL
Qty: 30 TAB | Refills: 3 | Status: SHIPPED | OUTPATIENT
Start: 2019-10-31 | End: 2020-02-02

## 2019-10-31 NOTE — PATIENT INSTRUCTIONS
Heart-Healthy Diet: After Your Visit Your Care Instructions A heart-healthy diet has lots of vegetables, fruits, nuts, beans, and whole grains, and is low in salt. It limits foods that are high in saturated fat, such as meats, cheeses, and fried foods. It may be hard to change your diet, but even small changes can lower your risk of heart attack and heart disease. Follow-up care is a key part of your treatment and safety. Be sure to make and go to all appointments, and call your doctor if you are having problems. It's also a good idea to know your test results and keep a list of the medicines you take. How can you care for yourself at home? Watch your portions · Learn what a serving is. A \"serving\" and a \"portion\" are not always the same thing. Make sure that you are not eating larger portions than are recommended. For example, a serving of pasta is ½ cup. A serving size of meat is 2 to 3 ounces. A 3-ounce serving is about the size of a deck of cards. Measure serving sizes until you are good at [place]" them. Keep in mind that restaurants often serve portions that are 2 or 3 times the size of one serving. · To keep your energy level up and keep you from feeling hungry, eat often but in smaller portions. · Eat only the number of calories you need to stay at a healthy weight. If you need to lose weight, eat fewer calories than your body burns (through exercise and other physical activity). Eat more fruits and vegetables · Eat a variety of fruit and vegetables every day. Dark green, deep orange, red, or yellow fruits and vegetables are especially good for you. Examples include spinach, carrots, peaches, and berries. · Keep carrots, celery, and other veggies handy for snacks. Buy fruit that is in season and store it where you can see it so that you will be tempted to eat it. · Cook dishes that have a lot of veggies in them, such as stir-fries and soups. Limit saturated and trans fat · Read food labels, and try to avoid saturated and trans fats. They increase your risk of heart disease. Trans fat is found in many processed foods such as cookies and crackers. · Use olive or canola oil when you cook. Try cholesterol-lowering spreads, such as Benecol or Take Control. · Bake, broil, grill, or steam foods instead of frying them. · Choose lean meats instead of high-fat meats such as hot dogs and sausages. Cut off all visible fat when you prepare meat. · Eat fish, skinless poultry, and meat alternatives such as soy products instead of high-fat meats. Soy products, such as tofu, may be especially good for your heart. · Choose low-fat or fat-free milk and dairy products. Eat fish · Eat at least two servings of fish a week. Certain fish, such as salmon and tuna, contain omega-3 fatty acids, which may help reduce your risk of heart attack. Eat foods high in fiber · Eat a variety of grain products every day. Include whole-grain foods that have lots of fiber and nutrients. Examples of whole-grain foods include oats, whole wheat bread, and brown rice. · Buy whole-grain breads and cereals, instead of white bread or pastries. Limit salt and sodium · Limit how much salt and sodium you eat to help lower your blood pressure. · Taste food before you salt it. Add only a little salt when you think you need it. With time, your taste buds will adjust to less salt. · Eat fewer snack items, fast foods, and other high-salt, processed foods. Check food labels for the amount of sodium in packaged foods. · Choose low-sodium versions of canned goods (such as soups, vegetables, and beans). Limit sugar · Limit drinks and foods with added sugar. These include candy, desserts, and soda pop. Limit alcohol · Limit alcohol to no more than 2 drinks a day for men and 1 drink a day for women. Too much alcohol can cause health problems. When should you call for help? Watch closely for changes in your health, and be sure to contact your doctor if: 
· You would like help planning heart-healthy meals. Where can you learn more? Go to [employer] Enter V137 in the search box to learn more about \"Heart-Healthy Diet: After Your Visit. \"  
© 2544-6988 Healthwise, [employer]orporated. Care instructions adapted under license by [employer] (which disclaims liability or warranty for this information). This care instruction is for use with your licensed healthcare professional. If you have questions about a medical condition or this instruction, always ask your healthcare professional. [place] [address] any warranty or liability for your use of this information. Content Version: 61.2.233641; Current as of: March 12, 2014 [address]

## 2019-10-31 NOTE — PROGRESS NOTES
HISTORY OF PRESENT ILLNESS  Divya Nielsen is a 80 y.o. female. Patient presents with:  Cholesterol Problem: discuss results   Results: Dexa     Her recent LDL was 156. She has never been on cholesterol before and does not follow any particular diet. Also, she does not get much physical activity because of her chronic foot pain. Also, her recent bone density read: This patient is osteopenic using the World Health Organization criteria    She has been receiving Prolia over 2-3 years ago. Her last bone density showed osteoporosis. Currently, she is taking prescription vitamin D due to a deficiency. She is taking calcium once a day. Her daughter is with her today. Review of Systems   Constitutional: Negative for malaise/fatigue. Respiratory: Negative for shortness of breath. Cardiovascular: Negative for chest pain and leg swelling. Musculoskeletal:        No bone pain       Visit Vitals  /50   Pulse 100   Temp 96.2 °F (35.7 °C) (Oral)   Resp 18   Ht 5' 1\" (1.549 m)   Wt 132 lb (59.9 kg)   BMI 24.94 kg/m²     Physical Exam   Constitutional: She is oriented to person, place, and time. She appears well-developed and well-nourished. No distress. Neck: Normal carotid pulses present. Carotid bruit is not present. Cardiovascular: Normal rate, regular rhythm and normal heart sounds. Exam reveals no gallop and no friction rub. No murmur heard. Pulmonary/Chest: Effort normal and breath sounds normal. No respiratory distress. She has no wheezes. She has no rales. Musculoskeletal: She exhibits no edema. Neurological: She is alert and oriented to person, place, and time. Skin: Skin is warm and dry. She is not diaphoretic. Nursing note and vitals reviewed. ASSESSMENT and PLAN    ICD-10-CM ICD-9-CM    1. Hypercholesterolemia E78.00 272.0 pravastatin (PRAVACHOL) 20 mg tablet      HEPATIC FUNCTION PANEL      LIPID PANEL   2.  Osteopenia of multiple sites M85.89 733.90 calcium-cholecalciferol, d3, (CALCIUM 600 + D) 600-125 mg-unit tab   3. Vitamin D deficiency E55.9 268.9 VITAMIN D, 25 HYDROXY        Needs a statin  Bone density improving  Now taking prescription vitamin D for her deficiency  Start pravastatin  Heart healthy diet  Continue Prolia  Increase calcium with D to BID  Lipid labs in 3 months  Vitamin D once she finishes her prescription    Follow-up and Dispositions    · Return in about 1 year (around 10/31/2020) for Medicare wellness. Reviewed plan of care. Patient has provided input and agrees with goals.

## 2019-12-04 LAB — 25(OH)D3+25(OH)D2 SERPL-MCNC: 74.9 NG/ML (ref 30–100)

## 2020-01-31 DIAGNOSIS — E78.00 HYPERCHOLESTEROLEMIA: ICD-10-CM

## 2020-02-01 LAB
ALBUMIN SERPL-MCNC: 4.3 G/DL (ref 3.6–4.6)
ALP SERPL-CCNC: 54 IU/L (ref 39–117)
ALT SERPL-CCNC: 11 IU/L (ref 0–32)
AST SERPL-CCNC: 19 IU/L (ref 0–40)
BILIRUB DIRECT SERPL-MCNC: 0.14 MG/DL (ref 0–0.4)
BILIRUB SERPL-MCNC: 0.4 MG/DL (ref 0–1.2)
CHOLEST SERPL-MCNC: 210 MG/DL (ref 100–199)
HDLC SERPL-MCNC: 48 MG/DL
INTERPRETATION, 910389: NORMAL
LDLC SERPL CALC-MCNC: 115 MG/DL (ref 0–99)
PROT SERPL-MCNC: 6.4 G/DL (ref 6–8.5)
TRIGL SERPL-MCNC: 235 MG/DL (ref 0–149)
VLDLC SERPL CALC-MCNC: 47 MG/DL (ref 5–40)

## 2020-02-02 DIAGNOSIS — E78.00 HYPERCHOLESTEROLEMIA: ICD-10-CM

## 2020-02-02 RX ORDER — PRAVASTATIN SODIUM 40 MG/1
40 TABLET ORAL
Qty: 30 TAB | Refills: 3 | Status: SHIPPED | OUTPATIENT
Start: 2020-02-02 | End: 2020-05-11

## 2020-02-11 DIAGNOSIS — E78.00 HYPERCHOLESTEROLEMIA: ICD-10-CM

## 2020-02-11 RX ORDER — PRAVASTATIN SODIUM 20 MG/1
TABLET ORAL
Qty: 90 TAB | Refills: 0 | OUTPATIENT
Start: 2020-02-11

## 2020-04-23 ENCOUNTER — APPOINTMENT (OUTPATIENT)
Dept: INFUSION THERAPY | Age: 85
End: 2020-04-23

## 2020-04-30 ENCOUNTER — APPOINTMENT (OUTPATIENT)
Dept: INFUSION THERAPY | Age: 85
End: 2020-04-30

## 2020-05-02 DIAGNOSIS — E78.00 HYPERCHOLESTEROLEMIA: ICD-10-CM

## 2020-05-10 DIAGNOSIS — E78.00 HYPERCHOLESTEROLEMIA: ICD-10-CM

## 2020-05-11 RX ORDER — PRAVASTATIN SODIUM 40 MG/1
TABLET ORAL
Qty: 90 TAB | Refills: 0 | Status: SHIPPED | OUTPATIENT
Start: 2020-05-11 | End: 2020-06-16

## 2020-05-31 ENCOUNTER — HOSPITAL ENCOUNTER (EMERGENCY)
Age: 85
Discharge: HOME | End: 2020-05-31
Attending: STUDENT IN AN ORGANIZED HEALTH CARE EDUCATION/TRAINING PROGRAM
Payer: MEDICARE

## 2020-05-31 ENCOUNTER — APPOINTMENT (OUTPATIENT)
Dept: CT IMAGING | Age: 85
End: 2020-05-31
Attending: PHYSICIAN ASSISTANT
Payer: MEDICARE

## 2020-05-31 ENCOUNTER — APPOINTMENT (OUTPATIENT)
Dept: GENERAL RADIOLOGY | Age: 85
End: 2020-05-31
Attending: PHYSICIAN ASSISTANT
Payer: MEDICARE

## 2020-05-31 VITALS
TEMPERATURE: 98 F | WEIGHT: 134 LBS | BODY MASS INDEX: 26.31 KG/M2 | OXYGEN SATURATION: 92 % | RESPIRATION RATE: 16 BRPM | SYSTOLIC BLOOD PRESSURE: 168 MMHG | HEIGHT: 60 IN | HEART RATE: 78 BPM | DIASTOLIC BLOOD PRESSURE: 82 MMHG

## 2020-05-31 DIAGNOSIS — R53.81 MALAISE AND FATIGUE: Primary | ICD-10-CM

## 2020-05-31 DIAGNOSIS — R53.83 MALAISE AND FATIGUE: Primary | ICD-10-CM

## 2020-05-31 LAB
ALBUMIN SERPL-MCNC: 4.1 G/DL (ref 3.5–5)
ALBUMIN/GLOB SERPL: 1.2 {RATIO} (ref 1.1–2.2)
ALP SERPL-CCNC: 62 U/L (ref 45–117)
ALT SERPL-CCNC: 19 U/L (ref 12–78)
AMORPH CRY URNS QL MICRO: ABNORMAL
ANION GAP SERPL CALC-SCNC: 5 MMOL/L (ref 5–15)
APPEARANCE UR: ABNORMAL
AST SERPL-CCNC: 21 U/L (ref 15–37)
BACTERIA URNS QL MICRO: ABNORMAL /HPF
BASOPHILS # BLD: 0 K/UL (ref 0–0.1)
BASOPHILS NFR BLD: 0 % (ref 0–1)
BILIRUB SERPL-MCNC: 0.3 MG/DL (ref 0.2–1)
BILIRUB UR QL: NEGATIVE
BUN SERPL-MCNC: 17 MG/DL (ref 6–20)
BUN/CREAT SERPL: 18 (ref 12–20)
CALCIUM SERPL-MCNC: 9 MG/DL (ref 8.5–10.1)
CHLORIDE SERPL-SCNC: 105 MMOL/L (ref 97–108)
CO2 SERPL-SCNC: 30 MMOL/L (ref 21–32)
COLOR UR: ABNORMAL
COMMENT, HOLDF: NORMAL
CREAT SERPL-MCNC: 0.94 MG/DL (ref 0.55–1.02)
DIFFERENTIAL METHOD BLD: ABNORMAL
EOSINOPHIL # BLD: 0 K/UL (ref 0–0.4)
EOSINOPHIL NFR BLD: 1 % (ref 0–7)
EPITH CASTS URNS QL MICRO: ABNORMAL /LPF
ERYTHROCYTE [DISTWIDTH] IN BLOOD BY AUTOMATED COUNT: 12.5 % (ref 11.5–14.5)
GLOBULIN SER CALC-MCNC: 3.4 G/DL (ref 2–4)
GLUCOSE BLD STRIP.AUTO-MCNC: 145 MG/DL (ref 65–100)
GLUCOSE SERPL-MCNC: 133 MG/DL (ref 65–100)
GLUCOSE UR STRIP.AUTO-MCNC: NEGATIVE MG/DL
GRAN CASTS URNS QL MICRO: ABNORMAL /LPF
HCT VFR BLD AUTO: 41.7 % (ref 35–47)
HGB BLD-MCNC: 14.3 G/DL (ref 11.5–16)
HGB UR QL STRIP: NEGATIVE
HYALINE CASTS URNS QL MICRO: ABNORMAL /LPF (ref 0–5)
IMM GRANULOCYTES # BLD AUTO: 0 K/UL (ref 0–0.04)
IMM GRANULOCYTES NFR BLD AUTO: 1 % (ref 0–0.5)
KETONES UR QL STRIP.AUTO: NEGATIVE MG/DL
LEUKOCYTE ESTERASE UR QL STRIP.AUTO: NEGATIVE
LYMPHOCYTES # BLD: 1.6 K/UL (ref 0.8–3.5)
LYMPHOCYTES NFR BLD: 19 % (ref 12–49)
MCH RBC QN AUTO: 31.9 PG (ref 26–34)
MCHC RBC AUTO-ENTMCNC: 34.3 G/DL (ref 30–36.5)
MCV RBC AUTO: 93.1 FL (ref 80–99)
MONOCYTES # BLD: 0.6 K/UL (ref 0–1)
MONOCYTES NFR BLD: 7 % (ref 5–13)
NEUTS SEG # BLD: 6.1 K/UL (ref 1.8–8)
NEUTS SEG NFR BLD: 72 % (ref 32–75)
NITRITE UR QL STRIP.AUTO: NEGATIVE
NRBC # BLD: 0 K/UL (ref 0–0.01)
NRBC BLD-RTO: 0 PER 100 WBC
PH UR STRIP: 7 [PH] (ref 5–8)
PLATELET # BLD AUTO: 264 K/UL (ref 150–400)
PMV BLD AUTO: 9.2 FL (ref 8.9–12.9)
POTASSIUM SERPL-SCNC: 3.6 MMOL/L (ref 3.5–5.1)
PROT SERPL-MCNC: 7.5 G/DL (ref 6.4–8.2)
PROT UR STRIP-MCNC: NEGATIVE MG/DL
RBC # BLD AUTO: 4.48 M/UL (ref 3.8–5.2)
RBC #/AREA URNS HPF: ABNORMAL /HPF (ref 0–5)
SAMPLES BEING HELD,HOLD: NORMAL
SERVICE CMNT-IMP: ABNORMAL
SODIUM SERPL-SCNC: 140 MMOL/L (ref 136–145)
SP GR UR REFRACTOMETRY: 1.01 (ref 1–1.03)
TROPONIN I SERPL-MCNC: <0.05 NG/ML
UR CULT HOLD, URHOLD: NORMAL
UROBILINOGEN UR QL STRIP.AUTO: 0.2 EU/DL (ref 0.2–1)
WBC # BLD AUTO: 8.4 K/UL (ref 3.6–11)
WBC URNS QL MICRO: ABNORMAL /HPF (ref 0–4)

## 2020-05-31 PROCEDURE — 80053 COMPREHEN METABOLIC PANEL: CPT

## 2020-05-31 PROCEDURE — 93005 ELECTROCARDIOGRAM TRACING: CPT

## 2020-05-31 PROCEDURE — 51701 INSERT BLADDER CATHETER: CPT

## 2020-05-31 PROCEDURE — 70450 CT HEAD/BRAIN W/O DYE: CPT

## 2020-05-31 PROCEDURE — 84484 ASSAY OF TROPONIN QUANT: CPT

## 2020-05-31 PROCEDURE — 82962 GLUCOSE BLOOD TEST: CPT

## 2020-05-31 PROCEDURE — 99285 EMERGENCY DEPT VISIT HI MDM: CPT

## 2020-05-31 PROCEDURE — 87086 URINE CULTURE/COLONY COUNT: CPT

## 2020-05-31 PROCEDURE — 87077 CULTURE AEROBIC IDENTIFY: CPT

## 2020-05-31 PROCEDURE — 96374 THER/PROPH/DIAG INJ IV PUSH: CPT

## 2020-05-31 PROCEDURE — 77030038269 HC DRN EXT URIN PURWCK BARD -A

## 2020-05-31 PROCEDURE — 85025 COMPLETE CBC W/AUTO DIFF WBC: CPT

## 2020-05-31 PROCEDURE — 74011250636 HC RX REV CODE- 250/636: Performed by: PHYSICIAN ASSISTANT

## 2020-05-31 PROCEDURE — 96361 HYDRATE IV INFUSION ADD-ON: CPT

## 2020-05-31 PROCEDURE — 77030019905 HC CATH URETH INTMIT MDII -A

## 2020-05-31 PROCEDURE — 36415 COLL VENOUS BLD VENIPUNCTURE: CPT

## 2020-05-31 PROCEDURE — 81001 URINALYSIS AUTO W/SCOPE: CPT

## 2020-05-31 PROCEDURE — 71046 X-RAY EXAM CHEST 2 VIEWS: CPT

## 2020-05-31 RX ORDER — ONDANSETRON 2 MG/ML
4 INJECTION INTRAMUSCULAR; INTRAVENOUS
Status: COMPLETED | OUTPATIENT
Start: 2020-05-31 | End: 2020-05-31

## 2020-05-31 RX ADMIN — ONDANSETRON 4 MG: 2 INJECTION INTRAMUSCULAR; INTRAVENOUS at 12:58

## 2020-05-31 RX ADMIN — SODIUM CHLORIDE 1000 ML: 900 INJECTION, SOLUTION INTRAVENOUS at 12:58

## 2020-05-31 NOTE — ED NOTES
Straight cath performed with Charles LOUIS assisting 600 ml yellow urine drained. Pt tolerated well. Purwick in place.

## 2020-05-31 NOTE — ED NOTES
Bedside and Verbal shift change report given to ERNESTINE (oncoming nurse) by Rolo Dupont (offgoing nurse). Report included the following information SBAR, Kardex, ED Summary, [facility] and Recent Results.

## 2020-05-31 NOTE — DISCHARGE INSTRUCTIONS

## 2020-05-31 NOTE — ED PROVIDER NOTES
81 y/o female with pmhx of HLD, osteoarthritis, osteopenia, vit D deficiency, schatzki's ring presents with her daughter due to weakness since this morning. Daughter reports that she takes care of her mom but pt is usually independent to ambulate. This morning she found her mom in her chair and she was unable to get up on her own. Daughter had to \"lift a lot\" to help her mom stand. She also required assistance of daughter and son-in-law toileting which is acutely different from her baseline. Daughter notices a lean to the right when pt is resting which she hasn't previously noticed. Denies fever, chills, headache, chest pain, shortness of breath, abdominal pain, dysuria, leg swelling. Past Medical History:   Diagnosis Date    ACP (advance care planning) 3/22/2018    The patient brought in her signed advance directive today (3/22/18).     Hypercholesterolemia 9/5/2019    Joint pain     Osteoarthritis     Osteopenia of multiple sites 10/31/2019    Osteoporosis     Schatzki's ring     Vitamin D deficiency 7/8/2018       Past Surgical History:   Procedure Laterality Date    COLONOSCOPY N/A 7/5/2018    COLONOSCOPY (LATEX ALLERGY) performed by Eli [facility] at [facility] AM[facility]    HX COLONOSCOPY      HX GI      Schatzkis ring dilitation    HX GYN      3 vaginal births    HX HEENT      bilateral cataracts    HX OTHER SURGICAL      Schatzki Ring Dilation         Family History:   Problem Relation Age of Onset    Hypertension Mother     Stroke Father     Lung Disease Brother     Cancer Brother         lung    Crohn's Disease Daughter     Thyroid Disease Daughter     Diabetes Son        Social History     Socioeconomic History    Marital status: SINGLE     Spouse name: Not on file    Number of children: Not on file    Years of education: Not on file    Highest education level: Not on file   Occupational History    Not on file   Social Needs    Financial resource strain: Not on file  Food insecurity     Worry: Not on file     Inability: Not on file    Transportation needs     Medical: Not on file     Non-medical: Not on file   Tobacco Use    Smoking status: Never Smoker    Smokeless tobacco: Never Used   Substance and Sexual Activity    Alcohol use: No    Drug use: No    Sexual activity: Never   Lifestyle    Physical activity     Days per week: Not on file     Minutes per session: Not on file    Stress: Not on file   Relationships    Social connections     Talks on phone: Not on file     Gets together: Not on file     Attends Sabianist service: Not on file     Active member of club or organization: Not on file     Attends meetings of clubs or organizations: Not on file     Relationship status: Not on file    Intimate partner violence     Fear of current or ex partner: Not on file     Emotionally abused: Not on file     Physically abused: Not on file     Forced sexual activity: Not on file   Other Topics Concern    Not on file   Social History Narrative    Not on file         ALLERGIES: Codeine    Review of Systems   Constitutional: Positive for fatigue. Negative for fever. HENT: Negative for congestion and sore throat. Respiratory: Negative for cough and shortness of breath. Cardiovascular: Negative for chest pain. Gastrointestinal: Negative for nausea and vomiting. Genitourinary: Negative for dysuria. Musculoskeletal: Negative for myalgias. Skin: Negative for rash. Neurological: Negative for dizziness, speech difficulty, weakness, numbness and headaches. Vitals:    05/31/20 1212 05/31/20 1243   BP: 156/85    Pulse: 94    Resp: 16    Temp: 98 °F (36.7 °C)    SpO2: 95% 95%   Weight: 60.8 kg (134 lb)    Height: 5' (1.524 m)             Physical Exam  Vitals signs and nursing note reviewed. Constitutional:       General: She is not in acute distress. Appearance: She is not ill-appearing. HENT:      Head: Normocephalic. Nose: No rhinorrhea. Mouth/Throat:      Mouth: Mucous membranes are moist.      Pharynx: Oropharynx is clear. No posterior oropharyngeal erythema. Eyes:      Pupils: Pupils are equal, round, and reactive to light. Neck:      Musculoskeletal: Normal range of motion. Cardiovascular:      Rate and Rhythm: Normal rate and regular rhythm. Heart sounds: Normal heart sounds. Pulmonary:      Effort: Pulmonary effort is normal.      Breath sounds: Normal breath sounds. No wheezing. Abdominal:      General: There is no distension. Palpations: Abdomen is soft. Skin:     General: Skin is warm. Capillary Refill: Capillary refill takes less than 2 seconds. Findings: No erythema or rash. Neurological:      Mental Status: She is alert.    Psychiatric:         Mood and Affect: Mood normal.         Behavior: Behavior normal.            //  EKG  Sinus rhythm with premature atrial complexes, rate of 94bpm, left axis deviation, no ST elevation or depression indicative of acute ischemia  //    Labs Reviewed   METABOLIC PANEL, COMPREHENSIVE - Abnormal; Notable for the following components:       Result Value    Glucose 133 (*)     GFR est non-AA 56 (*)     All other components within normal limits   CBC WITH AUTOMATED DIFF - Abnormal; Notable for the following components:    IMMATURE GRANULOCYTES 1 (*)     All other components within normal limits   URINALYSIS W/MICROSCOPIC - Abnormal; Notable for the following components:    Appearance CLOUDY (*)     Bacteria 1+ (*)     Amorphous Crystals 1+ (*)     Granular cast 2-5 (*)     All other components within normal limits   GLUCOSE, POC - Abnormal; Notable for the following components:    Glucose (POC) 145 (*)     All other components within normal limits   URINE CULTURE HOLD SAMPLE   CULTURE, URINE   SAMPLES BEING HELD   TROPONIN I   SAMPLE TO BLOOD BANK     Medications   sodium chloride 0.9 % bolus infusion 1,000 mL (0 mL IntraVENous IV Completed 5/31/20 8388)   ondansetron [facility]) injection 4 mg (4 mg IntraVENous Given 5/31/20 1258)     CT HEAD WO CONT   Final Result   IMPRESSION:    No acute infarct, intracranial hemorrhage, or intracranial mass. Severe   periventricular white matter disease is compatible with chronic small vessel   ischemia. XR CHEST PA LAT   Final Result   IMPRESSION: No acute process. No significant change from the prior study. [facility]  ED Course as of May 31 2047   Sun May 31, 2020   1812 [facility] diffulty[facility] [GL]      ED Course User Index  [GL] Kassy Paul MD     79 y/o female with weakness and increased confusion since this morning. Head CT reveals no acute process. EKG shows no acute ischemia or arrhythmia, troponin negative. No acute pathologies identified chest xray, UA, blood work. Weakness improved with 1L bolus. Ambulated well and returned to baseline per daughter. Discussed increasing PO fluid intake at home. Discussed indications for return to Ed such as increased weakness, fever, inability to tolerate PO    Case reviewed with attending physician, Dr. Martha Bravo.       Procedures    Jayson Burciaga PA-C  5/31/2020

## 2020-05-31 NOTE — ED TRIAGE NOTES
Pt arrives with daughter with c/o generalized weakness that started when this morning when she got up. Daughter states Maddison Stone wasn't able to help us get her up out of the chair and I had to help her go to the bathroom this morning. She normally walks with a walker and has a shuffling gait. \"  Pt denies pain at this time, reports some nausea. Pt denies fevers, vomiting. Pt began vomiting in treatment room.

## 2020-05-31 NOTE — ED NOTES
Patient reports that nausea is getting better, denies any pain. Unable to urinate at this time. Will continue to monitor. Daugther is at the bed side.

## 2020-06-01 ENCOUNTER — PATIENT OUTREACH (OUTPATIENT)
Dept: FAMILY MEDICINE CLINIC | Age: 85
End: 2020-06-01

## 2020-06-01 LAB
ATRIAL RATE: 94 BPM
CALCULATED P AXIS, ECG09: 74 DEGREES
CALCULATED R AXIS, ECG10: -23 DEGREES
CALCULATED T AXIS, ECG11: 39 DEGREES
DIAGNOSIS, 93000: NORMAL
P-R INTERVAL, ECG05: 152 MS
Q-T INTERVAL, ECG07: 368 MS
QRS DURATION, ECG06: 86 MS
QTC CALCULATION (BEZET), ECG08: 460 MS
VENTRICULAR RATE, ECG03: 94 BPM

## 2020-06-01 NOTE — PROGRESS NOTES
Patient contacted regarding recent discharge and COVID-19 risk. Discussed COVID-19 related testing which was not done at this time. Test results were not done. Patient informed of results, if available? no    Care Transition Nurse/ Ambulatory Care Manager contacted the family by telephone to perform post discharge assessment. Verified name and  with family as identifiers. Patient has following risk factors of: no known risk factors. CTN/ACM reviewed discharge instructions, medical action plan and red flags related to discharge diagnosis. Reviewed and educated them on any new and changed medications related to discharge diagnosis. Advised obtaining a 90-day supply of all daily and as-needed medications. Education provided regarding infection prevention, and signs and symptoms of COVID-19 and when to seek medical attention with family who verbalized understanding. Discussed exposure protocols and quarantine from [address] you at higher risk for severe illness  and given an opportunity for questions and concerns. The family agrees to contact the COVID-19 hotline [phone] or PCP office for questions related to their healthcare. CTN/ACM provided contact information for future reference. From CDC: Are you at higher risk for severe illness?  Wash your hands often.  Avoid close contact (6 feet, which is about two arm lengths) with people who are sick.  Put distance between yourself and other people if COVID-19 is spreading in your community.  Clean and disinfect frequently touched surfaces.  Avoid all cruise travel and non-essential air travel.  Call your healthcare professional if you have concerns about COVID-19 and your underlying condition or if you are sick.     For more information on steps you can take to protect yourself, see CDC's How to Protect Yourself      Patient/family/caregiver given information for [facility] and agrees to enroll no    Plan for follow-up call in 7-14 days based on severity of symptoms and risk factors.

## 2020-06-02 LAB
BACTERIA SPEC CULT: ABNORMAL
BACTERIA SPEC CULT: ABNORMAL
CC UR VC: ABNORMAL
SERVICE CMNT-IMP: ABNORMAL

## 2020-06-02 RX ORDER — AMOXICILLIN 500 MG/1
500 TABLET, FILM COATED ORAL 2 TIMES DAILY
Qty: 20 TAB | Refills: 0 | Status: SHIPPED | OUTPATIENT
Start: 2020-06-02 | End: 2020-06-12

## 2020-06-02 NOTE — PROGRESS NOTES
I spoke with pt daughter concerning urine results.   [facility]

## 2020-06-08 ENCOUNTER — VIRTUAL VISIT (OUTPATIENT)
Dept: FAMILY MEDICINE CLINIC | Age: 85
End: 2020-06-08

## 2020-06-08 ENCOUNTER — TELEPHONE (OUTPATIENT)
Dept: FAMILY MEDICINE CLINIC | Age: 85
End: 2020-06-08

## 2020-06-08 DIAGNOSIS — R53.1 WEAKNESS GENERALIZED: Primary | ICD-10-CM

## 2020-06-08 DIAGNOSIS — N30.00 ACUTE CYSTITIS WITHOUT HEMATURIA: ICD-10-CM

## 2020-06-08 NOTE — PROGRESS NOTES
Chief Complaint   Patient presents with    Extremity Weakness     pt unable to feed herself with out assistance     Decreased Appetite     Pt is havign virtual appointment with daughter in [place], [place].

## 2020-06-08 NOTE — PROGRESS NOTES
Jonnie Herrera is a 80 y.o. female who was seen by synchronous (real-time) audio-video technology on 6/8/2020. Assessment & Plan:   Diagnoses and all orders for this visit:    1. Weakness generalized    2. Acute cystitis without hematuria        Likely infectious, not improving  Gave her daughter the number for [facility] so they can evaluate her today, will likely need a repeat UA    Follow-up and Dispositions    · Return if symptoms worsen or fail to improve. Reviewed plan of care. Patient and her daughter have provided input and agree with goals. CPT Codes 58047-99189 for Established Patients may apply to this Telehealth Visit      Subjective:   Jonnie Herrera was seen for Extremity Weakness (pt unable to feed herself with out assistance ) and Decreased Appetite      Patient presents with:  Extremity Weakness: pt unable to feed herself with out assistance   Decreased Appetite    Her daughter is with her today and gives some of the history. She was seen in the ER for this 5/31/20 and treated for a UTI with amoxicillin. At that time, she was unable to do anything for herself. This started acutely. She has not gotten any better, and is, in fact, worse. This am, she reported that she did not feel well, was aching and had a temperature of 99.5. Other than her UA, her labs were unremarkable. Head CT was remarkable for chronic small vessel ischemia. She still cannot do anything for herself. Nothing makes her better or worse. The family has contacted  and have a meeting with them next week. She sometimes \"perks up\" a little for no apparent reason. Her daughter notes she has been shuffling for about a year. Review of Systems   Constitutional: Positive for malaise/fatigue. Negative for chills and fever. HENT: Negative for congestion and sore throat. No difficulty swallowing. Respiratory: Negative for cough.     Cardiovascular: Negative for chest pain. Gastrointestinal: Negative for abdominal pain, heartburn, nausea and vomiting. Genitourinary: Positive for hematuria. Negative for dysuria, frequency and urgency. Musculoskeletal: Positive for myalgias. Neurological: Positive for headaches. She had a headache earlier this am.   Psychiatric/Behavioral: Positive for memory loss. Negative for depression. The patient is not nervous/anxious and does not have insomnia. Objective:     Physical Exam  Constitutional:       General: She is not in acute distress. Appearance: Normal appearance. She is not diaphoretic. Neurological:      Mental Status: She is alert and oriented to person, place, and time. Comments: Facies symmetric         Due to this being a TeleHealth evaluation, many elements of the physical examination are unable to be assessed. We discussed the expected course, resolution and complications of the diagnosis(es) in detail. Medication risks, benefits, costs, interactions, and alternatives were discussed as indicated. I advised her to contact the office if her condition worsens, changes or fails to improve as anticipated. She expressed understanding with the diagnosis(es) and plan. Pursuant to the emergency declaration under the [facility], [facility] waiver authority and the [employer] General Act, this Virtual  Visit was conducted, with patient's consent, to reduce the patient's risk of exposure to COVID-19 and provide continuity of care for an established patient. Services were provided through a video synchronous discussion virtually to substitute for in-person clinic visit.     Andre Gaitan MD

## 2020-06-09 NOTE — TELEPHONE ENCOUNTER
Called and spoke with pt's daughter. She advises pt seems to be doing much better this morning. She started the new antibiotic last night and ate a little bit this morning. Advised pt's daughter to call office if either of them need anything and she agrees with plan.

## 2020-06-12 ENCOUNTER — TELEPHONE (OUTPATIENT)
Dept: FAMILY MEDICINE CLINIC | Age: 85
End: 2020-06-12

## 2020-06-12 DIAGNOSIS — R41.3 MEMORY LOSS: Primary | ICD-10-CM

## 2020-06-12 NOTE — TELEPHONE ENCOUNTER
----- Message from [employer] sent at 6/12/2020  2:25 PM EDT -----  Regarding: Dr Laura Goodman  Pt 's daughter Sherice Hernandez is calling to follow up on Monday's call, please call [phone]

## 2020-06-15 NOTE — TELEPHONE ENCOUNTER
Phone call with patient's daughter, Jose E. Evidently, she had a fever of 101.4 3 days a go. Her antibiotic was switched for her UTI the night before. [facility] who said the fever was fine. She was more \"perked up\" yesterday and her fever is down. However, she is still slow, weak and unable to do anything for herself. Also she has been having memory issues, even before she got the UTI. Referred to neurology.

## 2020-06-16 ENCOUNTER — APPOINTMENT (OUTPATIENT)
Dept: CT IMAGING | Age: 85
DRG: 641 | End: 2020-06-16
Attending: EMERGENCY MEDICINE
Payer: MEDICARE

## 2020-06-16 ENCOUNTER — PATIENT OUTREACH (OUTPATIENT)
Dept: FAMILY MEDICINE CLINIC | Age: 85
End: 2020-06-16

## 2020-06-16 ENCOUNTER — HOSPITAL ENCOUNTER (INPATIENT)
Age: 85
LOS: 6 days | Discharge: NURSING, REHAB OR LONG-TERM CARE | DRG: 641 | End: 2020-06-22
Attending: EMERGENCY MEDICINE | Admitting: FAMILY MEDICINE
Payer: MEDICARE

## 2020-06-16 ENCOUNTER — APPOINTMENT (OUTPATIENT)
Dept: GENERAL RADIOLOGY | Age: 85
DRG: 641 | End: 2020-06-16
Attending: EMERGENCY MEDICINE
Payer: MEDICARE

## 2020-06-16 DIAGNOSIS — F03.C0 SEVERE DEMENTIA: ICD-10-CM

## 2020-06-16 DIAGNOSIS — Z71.89 COUNSELING REGARDING ADVANCE CARE PLANNING AND GOALS OF CARE: ICD-10-CM

## 2020-06-16 DIAGNOSIS — E87.1 HYPONATREMIA: ICD-10-CM

## 2020-06-16 DIAGNOSIS — F03.90 DEMENTIA WITHOUT BEHAVIORAL DISTURBANCE, UNSPECIFIED DEMENTIA TYPE: ICD-10-CM

## 2020-06-16 DIAGNOSIS — R53.83 LETHARGY: Primary | ICD-10-CM

## 2020-06-16 DIAGNOSIS — R53.81 DEBILITY: ICD-10-CM

## 2020-06-16 LAB
ALBUMIN SERPL-MCNC: 3.5 G/DL (ref 3.5–5)
ALBUMIN/GLOB SERPL: 0.8 {RATIO} (ref 1.1–2.2)
ALP SERPL-CCNC: 79 U/L (ref 45–117)
ALT SERPL-CCNC: 23 U/L (ref 12–78)
ANION GAP SERPL CALC-SCNC: 5 MMOL/L (ref 5–15)
ANION GAP SERPL CALC-SCNC: 7 MMOL/L (ref 5–15)
ANION GAP SERPL CALC-SCNC: 8 MMOL/L (ref 5–15)
APPEARANCE UR: CLEAR
AST SERPL-CCNC: 27 U/L (ref 15–37)
BASOPHILS # BLD: 0 K/UL (ref 0–0.1)
BASOPHILS NFR BLD: 0 % (ref 0–1)
BILIRUB SERPL-MCNC: 0.4 MG/DL (ref 0.2–1)
BILIRUB UR QL: NEGATIVE
BNP SERPL-MCNC: 462 PG/ML
BUN SERPL-MCNC: 18 MG/DL (ref 6–20)
BUN SERPL-MCNC: 18 MG/DL (ref 6–20)
BUN SERPL-MCNC: 19 MG/DL (ref 6–20)
BUN/CREAT SERPL: 17 (ref 12–20)
BUN/CREAT SERPL: 19 (ref 12–20)
BUN/CREAT SERPL: 21 (ref 12–20)
CALCIUM SERPL-MCNC: 8.1 MG/DL (ref 8.5–10.1)
CALCIUM SERPL-MCNC: 8.3 MG/DL (ref 8.5–10.1)
CALCIUM SERPL-MCNC: 8.8 MG/DL (ref 8.5–10.1)
CHLORIDE SERPL-SCNC: 98 MMOL/L (ref 97–108)
CHLORIDE SERPL-SCNC: 99 MMOL/L (ref 97–108)
CHLORIDE SERPL-SCNC: 99 MMOL/L (ref 97–108)
CHOLEST SERPL-MCNC: 117 MG/DL
CK SERPL-CCNC: 125 U/L (ref 26–192)
CO2 SERPL-SCNC: 24 MMOL/L (ref 21–32)
CO2 SERPL-SCNC: 24 MMOL/L (ref 21–32)
CO2 SERPL-SCNC: 26 MMOL/L (ref 21–32)
COLOR UR: ABNORMAL
COMMENT, HOLDF: NORMAL
CREAT SERPL-MCNC: 0.87 MG/DL (ref 0.55–1.02)
CREAT SERPL-MCNC: 0.97 MG/DL (ref 0.55–1.02)
CREAT SERPL-MCNC: 1.09 MG/DL (ref 0.55–1.02)
CREAT UR-MCNC: 128 MG/DL
DIFFERENTIAL METHOD BLD: ABNORMAL
EOSINOPHIL # BLD: 0.2 K/UL (ref 0–0.4)
EOSINOPHIL NFR BLD: 2 % (ref 0–7)
ERYTHROCYTE [DISTWIDTH] IN BLOOD BY AUTOMATED COUNT: 12.4 % (ref 11.5–14.5)
FOLATE SERPL-MCNC: 31.1 NG/ML (ref 5–21)
GLOBULIN SER CALC-MCNC: 4.3 G/DL (ref 2–4)
GLUCOSE SERPL-MCNC: 105 MG/DL (ref 65–100)
GLUCOSE SERPL-MCNC: 115 MG/DL (ref 65–100)
GLUCOSE SERPL-MCNC: 121 MG/DL (ref 65–100)
GLUCOSE UR STRIP.AUTO-MCNC: NEGATIVE MG/DL
HCT VFR BLD AUTO: 41.1 % (ref 35–47)
HDLC SERPL-MCNC: 39 MG/DL
HDLC SERPL: 3 {RATIO} (ref 0–5)
HGB BLD-MCNC: 14.1 G/DL (ref 11.5–16)
HGB UR QL STRIP: NEGATIVE
IMM GRANULOCYTES # BLD AUTO: 0.1 K/UL (ref 0–0.04)
IMM GRANULOCYTES NFR BLD AUTO: 1 % (ref 0–0.5)
KETONES UR QL STRIP.AUTO: 15 MG/DL
LACTATE SERPL-SCNC: 1 MMOL/L (ref 0.4–2)
LDLC SERPL CALC-MCNC: 59.6 MG/DL (ref 0–100)
LEUKOCYTE ESTERASE UR QL STRIP.AUTO: NEGATIVE
LIPID PROFILE,FLP: NORMAL
LYMPHOCYTES # BLD: 0.4 K/UL (ref 0.8–3.5)
LYMPHOCYTES NFR BLD: 5 % (ref 12–49)
MAGNESIUM SERPL-MCNC: 2.3 MG/DL (ref 1.6–2.4)
MCH RBC QN AUTO: 31.3 PG (ref 26–34)
MCHC RBC AUTO-ENTMCNC: 34.3 G/DL (ref 30–36.5)
MCV RBC AUTO: 91.3 FL (ref 80–99)
MONOCYTES # BLD: 0.6 K/UL (ref 0–1)
MONOCYTES NFR BLD: 7 % (ref 5–13)
NEUTS SEG # BLD: 7 K/UL (ref 1.8–8)
NEUTS SEG NFR BLD: 85 % (ref 32–75)
NITRITE UR QL STRIP.AUTO: NEGATIVE
NRBC # BLD: 0 K/UL (ref 0–0.01)
NRBC BLD-RTO: 0 PER 100 WBC
OSMOLALITY SERPL: 282 MOSM/KG H2O
OSMOLALITY UR: 621 MOSM/KG H2O
PH UR STRIP: 6.5 [PH] (ref 5–8)
PLATELET # BLD AUTO: 310 K/UL (ref 150–400)
PMV BLD AUTO: 9.1 FL (ref 8.9–12.9)
POTASSIUM SERPL-SCNC: 4.1 MMOL/L (ref 3.5–5.1)
POTASSIUM SERPL-SCNC: 4.2 MMOL/L (ref 3.5–5.1)
POTASSIUM SERPL-SCNC: 4.5 MMOL/L (ref 3.5–5.1)
PROT SERPL-MCNC: 7.8 G/DL (ref 6.4–8.2)
PROT UR STRIP-MCNC: NEGATIVE MG/DL
RBC # BLD AUTO: 4.5 M/UL (ref 3.8–5.2)
RBC MORPH BLD: ABNORMAL
SAMPLES BEING HELD,HOLD: NORMAL
SODIUM SERPL-SCNC: 129 MMOL/L (ref 136–145)
SODIUM SERPL-SCNC: 130 MMOL/L (ref 136–145)
SODIUM SERPL-SCNC: 131 MMOL/L (ref 136–145)
SODIUM UR-SCNC: 78 MMOL/L
SP GR UR REFRACTOMETRY: 1.02 (ref 1–1.03)
T4 FREE SERPL-MCNC: 1.1 NG/DL (ref 0.8–1.5)
TRIGL SERPL-MCNC: 92 MG/DL (ref ?–150)
TSH SERPL DL<=0.05 MIU/L-ACNC: 2.16 UIU/ML (ref 0.36–3.74)
UROBILINOGEN UR QL STRIP.AUTO: 1 EU/DL (ref 0.2–1)
VIT B12 SERPL-MCNC: 814 PG/ML (ref 193–986)
VLDLC SERPL CALC-MCNC: 18.4 MG/DL
WBC # BLD AUTO: 8.3 K/UL (ref 3.6–11)

## 2020-06-16 PROCEDURE — 83935 ASSAY OF URINE OSMOLALITY: CPT

## 2020-06-16 PROCEDURE — 82550 ASSAY OF CK (CPK): CPT

## 2020-06-16 PROCEDURE — 93005 ELECTROCARDIOGRAM TRACING: CPT

## 2020-06-16 PROCEDURE — 81003 URINALYSIS AUTO W/O SCOPE: CPT

## 2020-06-16 PROCEDURE — 74011250636 HC RX REV CODE- 250/636: Performed by: STUDENT IN AN ORGANIZED HEALTH CARE EDUCATION/TRAINING PROGRAM

## 2020-06-16 PROCEDURE — 84443 ASSAY THYROID STIM HORMONE: CPT

## 2020-06-16 PROCEDURE — 87040 BLOOD CULTURE FOR BACTERIA: CPT

## 2020-06-16 PROCEDURE — 71045 X-RAY EXAM CHEST 1 VIEW: CPT

## 2020-06-16 PROCEDURE — 87086 URINE CULTURE/COLONY COUNT: CPT

## 2020-06-16 PROCEDURE — 36415 COLL VENOUS BLD VENIPUNCTURE: CPT

## 2020-06-16 PROCEDURE — 74011250636 HC RX REV CODE- 250/636: Performed by: EMERGENCY MEDICINE

## 2020-06-16 PROCEDURE — 74011000258 HC RX REV CODE- 258: Performed by: STUDENT IN AN ORGANIZED HEALTH CARE EDUCATION/TRAINING PROGRAM

## 2020-06-16 PROCEDURE — 65660000000 HC RM CCU STEPDOWN

## 2020-06-16 PROCEDURE — 80061 LIPID PANEL: CPT

## 2020-06-16 PROCEDURE — 80048 BASIC METABOLIC PNL TOTAL CA: CPT

## 2020-06-16 PROCEDURE — 80053 COMPREHEN METABOLIC PANEL: CPT

## 2020-06-16 PROCEDURE — 85025 COMPLETE CBC W/AUTO DIFF WBC: CPT

## 2020-06-16 PROCEDURE — 97161 PT EVAL LOW COMPLEX 20 MIN: CPT

## 2020-06-16 PROCEDURE — 51701 INSERT BLADDER CATHETER: CPT

## 2020-06-16 PROCEDURE — 83930 ASSAY OF BLOOD OSMOLALITY: CPT

## 2020-06-16 PROCEDURE — 70450 CT HEAD/BRAIN W/O DYE: CPT

## 2020-06-16 PROCEDURE — 83735 ASSAY OF MAGNESIUM: CPT

## 2020-06-16 PROCEDURE — 84439 ASSAY OF FREE THYROXINE: CPT

## 2020-06-16 PROCEDURE — 82746 ASSAY OF FOLIC ACID SERUM: CPT

## 2020-06-16 PROCEDURE — 82570 ASSAY OF URINE CREATININE: CPT

## 2020-06-16 PROCEDURE — 77030019905 HC CATH URETH INTMIT MDII -A

## 2020-06-16 PROCEDURE — 83605 ASSAY OF LACTIC ACID: CPT

## 2020-06-16 PROCEDURE — 84300 ASSAY OF URINE SODIUM: CPT

## 2020-06-16 PROCEDURE — 94760 N-INVAS EAR/PLS OXIMETRY 1: CPT

## 2020-06-16 PROCEDURE — 83880 ASSAY OF NATRIURETIC PEPTIDE: CPT

## 2020-06-16 PROCEDURE — 97116 GAIT TRAINING THERAPY: CPT

## 2020-06-16 PROCEDURE — 99284 EMERGENCY DEPT VISIT MOD MDM: CPT

## 2020-06-16 PROCEDURE — 82607 VITAMIN B-12: CPT

## 2020-06-16 RX ORDER — HEPARIN SODIUM 5000 [USP'U]/ML
5000 INJECTION, SOLUTION INTRAVENOUS; SUBCUTANEOUS EVERY 8 HOURS
Status: DISCONTINUED | OUTPATIENT
Start: 2020-06-16 | End: 2020-06-22 | Stop reason: HOSPADM

## 2020-06-16 RX ORDER — SODIUM CHLORIDE 9 MG/ML
100 INJECTION, SOLUTION INTRAVENOUS ONCE
Status: COMPLETED | OUTPATIENT
Start: 2020-06-16 | End: 2020-06-16

## 2020-06-16 RX ORDER — ACETAMINOPHEN 325 MG/1
650 TABLET ORAL
Status: DISCONTINUED | OUTPATIENT
Start: 2020-06-16 | End: 2020-06-22 | Stop reason: HOSPADM

## 2020-06-16 RX ORDER — PRAVASTATIN SODIUM 40 MG/1
40 TABLET ORAL
COMMUNITY
End: 2020-06-22

## 2020-06-16 RX ORDER — SULFAMETHOXAZOLE AND TRIMETHOPRIM 800; 160 MG/1; MG/1
1 TABLET ORAL 2 TIMES DAILY
COMMUNITY
Start: 2020-06-11 | End: 2020-06-22

## 2020-06-16 RX ORDER — PROCHLORPERAZINE EDISYLATE 5 MG/ML
10 INJECTION INTRAMUSCULAR; INTRAVENOUS
Status: DISCONTINUED | OUTPATIENT
Start: 2020-06-16 | End: 2020-06-22 | Stop reason: HOSPADM

## 2020-06-16 RX ORDER — SODIUM CHLORIDE 0.9 % (FLUSH) 0.9 %
5-40 SYRINGE (ML) INJECTION AS NEEDED
Status: DISCONTINUED | OUTPATIENT
Start: 2020-06-16 | End: 2020-06-22 | Stop reason: HOSPADM

## 2020-06-16 RX ORDER — SODIUM CHLORIDE 0.9 % (FLUSH) 0.9 %
5-40 SYRINGE (ML) INJECTION EVERY 8 HOURS
Status: DISCONTINUED | OUTPATIENT
Start: 2020-06-16 | End: 2020-06-22 | Stop reason: HOSPADM

## 2020-06-16 RX ADMIN — CEFTRIAXONE SODIUM 1 G: 1 INJECTION, POWDER, FOR SOLUTION INTRAMUSCULAR; INTRAVENOUS at 18:04

## 2020-06-16 RX ADMIN — HEPARIN SODIUM 5000 UNITS: 5000 INJECTION INTRAVENOUS; SUBCUTANEOUS at 18:06

## 2020-06-16 RX ADMIN — SODIUM CHLORIDE 100 ML/HR: 900 INJECTION, SOLUTION INTRAVENOUS at 16:27

## 2020-06-16 NOTE — PROGRESS NOTES
[facility]: MED RECONCILIATION    Comments/Recommendations:     Patient unable to confirm name, , allergies and preferred pharmacy  Pt received Prolia 6-7 months ago       Information obtained from: Family Member - Daughter    Allergies: Codeine    Prior to Admission Medications:     Prior to Admission Medications   Prescriptions Last Dose Informant Patient Reported? Taking? Cholecalciferol, Vitamin D3, (VITAMIN D3) 2,000 unit cap capsule 2020 at Unknown time Child Yes Yes   Sig: Take 2,000 Units by mouth daily. calcium-cholecalciferol, d3, (CALCIUM 600 + D) 600-125 mg-unit tab 2020 at Unknown time Child Yes Yes   Sig: Take 1 Tab by mouth two (2) times a day. denosumab (PROLIA) 60 mg/mL injection Unknown at Unknown time Child No No   Sig: Prolia 60 mg/mL subcutaneous syringe x 1   pravastatin (PRAVACHOL) 40 mg tablet 6/15/2020 at Unknown time Child Yes Yes   Sig: Take 40 mg by mouth nightly. trimethoprim-sulfamethoxazole (Bactrim DS) 160-800 mg per tablet 6/15/2020 at Unknown time Child Yes Yes   Sig: Take 1 Tab by mouth two (2) times a day. vit C/E/Zn/coppr/lutein/zeaxan (PRESERVISION AREDS-2 PO) 2020 at Unknown time Child Yes Yes   Sig: Take 1 Cap by mouth daily. Facility-Administered Medications: None           [employer]. RASHIDA    Clinical Staff Pharmacist  [address] [place] [address]  [phone]

## 2020-06-16 NOTE — ROUTINE PROCESS
Bedside shift change report given to [facility] (oncoming nurse) by [employer] [facility] (offgoing nurse). Report included the following information SBAR, Kardex, Procedure Summary, Intake/Output, MAR, Accordion, Recent Results and Med Rec Status.

## 2020-06-16 NOTE — PROGRESS NOTES
6/16/2020  4:50 PM  R/O COVID   CM completed assessment w/ pt's Dtr Cheryl Pepper (C) [phone] in person, pt, Dtr and CM all wore masks. Charted demographics verified, pt lives w/ Dtr Shaquille Mcdonald, son-in-law and adult grandson in 2 story home, there are 4 entry steps, the family has moved the pt's bedroom to . At baseline, pt requires assist for all ADLs, ambulatory w/ RW, relies on family for transport. No known sick contacts or COVID exposure  Reason for Admission:   Hyponatremia   pt is a 79 yo  female presents to [facility] Ed w/ Dtr c/o pt began getting sick in beginning of June was diagnosed w/ UTI, began vomiting yesterday, unable to [address]. PMHx:   Osteoarthritis, osteopenia, hypercholesterolemia               RUR Score:   9%  Low Risk                  Plan for utilizing home health:     No history, PT/OT evals pending   Rx: Medicaid, [facility]  DME: RW,cane, standard walker, transport chair  PCP: First and Last name:  Daniel Barry MD   Name of Practice: [address]   Are you a current patient: Yes/No: Yes   Approximate date of last visit: 6/8/2020   Can you participate in a virtual visit with your PCP: Yes                    Current Advanced Directive/Advance Care Plan: AMD on File, Dtr Cheryl Pepper (C) [phone] is surrogate, pt is DNR                         Transition of Care Plan:                    [address] admission for medical management, PT/OT evals, neurology consult  2. CM to follow through for treatment/response  3. R/O COVID for placement  4. D/C when stable  Plan A: SNF for rehab, transition to LTC, referral placed in [employer] to [employer], will need UAI  Plan B: LTC, pt has Medicaid, will need UAI  5. Outpatient f/u PCP  6.  Transport TBD will likely need stretcher    Care Management Interventions  PCP Verified by CM: Mariel Mayo MD)  Last Visit to PCP: 06/08/20  Palliative Care Criteria Met (RRAT>21 & CHF Dx)?: Yes  Palliative Consult Recommended?: Yes  Mode of Transport at Discharge: Other (see comment)(TBD)  Transition of Care Consult (CM Consult):  Other(LTC placement)  Physical Therapy Consult: Yes  Occupational Therapy Consult: Yes  Speech Therapy Consult: No  Current Support Network: Relative's Home(pt lives w/ Dtr and son in law, in pvt residence, at baseline requires assist w/all ADLs, ambulatory w RW relies on family for transport)  Confirm Follow Up Transport: Family  The Plan for Transition of Care is Related to the Following Treatment Goals : SNF  The Patient and/or Patient Representative was Provided with a Choice of Provider and Agrees with the Discharge Plan?: Yes  Name of the Patient Representative Who was Provided with a Choice of Provider and Agrees with the Discharge Plan: Concha Hughes Daughter  [place] of Choice List was Provided with Basic Dialogue that Supports the Patient's Individualized Plan of Care/Goals, Treatment Preferences and Shares the Quality Data Associated with the Providers?: (S) Yes  Discharge Location  Discharge Placement: Skilled nursing facility    Dong Jeffers

## 2020-06-16 NOTE — H&P
[address] [place] [address]   Office [phone]  Fax [phone]       Admission H&P     Name: Danilo Arroyo MRN: [id number]  Sex: Female   YOB: 1931  Age: 80 y.o. PCP: Hang Pichardo MD     Source of Information: patient, medical records    Chief complaint: fatigue & cognitive decline     History of Present Illness  Danilo Arroyo is a 80 y.o. female with known PMH of dementia, OA here with her daughter for generalized weakness and lethargy. Pt has had increased weakness and fatigue for the past 3 weeks. She was diagnosed with UTI in the ED on 5/31 and tried on 3 antibiotics (amoxicillin, macrobid, and then Bactrim DS). Now she is unable to take antibiotics 2/2 to vomiting x2 today. Her daughter says she hasn't eaten since yesterday morning either and has barely been able to tolerate liquids today. She has had subjective fever to 101 max 2 days ago and a temp to 100.4 today. Pt unable to verbalize any c/o pain at this time. All history per daughter. No other known complaints at this time. Of note: Dtr states that it is becoming harder to take of patient at home. She is no longer able to perform any ADL's and transferring Pt is very difficult. She and her  have requested dispo to long term care. COVID Screening Questions:   Experiencing any of the following symptoms: fever, chills, cough, SOB, diarrhea, URI symptoms. Fever   Any Sick contacts with fever, cough, diarrhea, SOB, URI symptoms. No  Traveled out of state or out of country. 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 No      In the Er:   vital signs were remarkable for HR to 105 (now down to 93), temp 99.5. Labs were remarkable for- Na 129, Cr 1.09 (0.8-0.9). CXR Showed- no acute process. CT head- no acute process     Pt was treated with NS at 100mL/hr.      Patient Vitals for the past 12 hrs:   Temp Pulse Resp BP SpO2   06/16/20 1345 99.5 °F (37.5 °C)     06/16/20 1323  93      06/16/20 1321     95 %   06/16/20 1015 99 °F (37.2 °C) (!) 105 16 147/83 94 %          Past Medical History:   Diagnosis Date    ACP (advance care planning) 3/22/2018    The patient brought in her signed advance directive today (3/22/18).  Hypercholesterolemia 9/5/2019    Joint pain     Osteoarthritis     Osteopenia of multiple sites 10/31/2019    Osteoporosis     Schatzki's ring     Vitamin D deficiency 7/8/2018        Home Medications   Prior to Admission medications    Medication Sig Start Date End Date Taking? Authorizing Provider   vit C/E/Zn/coppr/lutein/zeaxan (PRESERVISION AREDS-2 PO) Take 1 Cap by mouth daily. Yes Provider, Historical   pravastatin (PRAVACHOL) 40 mg tablet Take 40 mg by mouth nightly. Yes Provider, Historical   trimethoprim-sulfamethoxazole (Bactrim DS) 160-800 mg per tablet Take 1 Tab by mouth two (2) times a day. 6/11/20 6/21/20 Yes Provider, Historical   calcium-cholecalciferol, d3, (CALCIUM 600 + D) 600-125 mg-unit tab Take 1 Tab by mouth two (2) times a day. 10/31/19  Yes Maximo Beth MD   Cholecalciferol, Vitamin D3, (VITAMIN D3) 2,000 unit cap capsule Take 2,000 Units by mouth daily. Yes Maximo Beth MD   pravastatin (PRAVACHOL) 40 mg tablet TAKE 1 TABLET BY MOUTH EVERY DAY AT NIGHT 5/11/20 6/16/20  Maximo Beth MD   denosumab (PROLIA) 60 mg/mL injection Prolia 60 mg/mL subcutaneous syringe x 1 9/5/19   Maximo Beth MD   FLUZONE HIGH-DOSE 7680-93, PF, syrg injection TO BE ADMINISTERED BY PHARMACIST FOR IMMUNIZATION 10/8/18 6/16/20  Provider, Historical   OTHER Take 2 Tabs by mouth daily. Indications: preservision areds  6/16/20  Provider, Historical       Allergies  Allergies   Allergen Reactions    Codeine Nausea Only       Past Medical History:   Diagnosis Date    ACP (advance care planning) 3/22/2018    The patient brought in her signed advance directive today (3/22/18).     Hypercholesterolemia 9/5/2019    Joint pain  Osteoarthritis     Osteopenia of multiple sites 10/31/2019    Osteoporosis     Schatzki's ring     Vitamin D deficiency 7/8/2018       Past Surgical History:   Procedure Laterality Date    COLONOSCOPY N/A 7/5/2018    COLONOSCOPY (LATEX ALLERGY) performed by Erin Sterling [facility] AM[facility]    HX COLONOSCOPY      HX GI      Schatzkis ring dilitation    HX GYN      3 vaginal births    HX HEENT      bilateral cataracts    HX OTHER SURGICAL      Schatzki Ring Dilation       Family History   Problem Relation Age of Onset    Hypertension Mother     Stroke Father     Lung Disease Brother     Cancer Brother         lung    Crohn's Disease Daughter     Thyroid Disease Daughter     Diabetes Son        Social History  Social History     Socioeconomic History    Marital status: SINGLE     Spouse name: Not on file    Number of children: Not on file    Years of education: Not on file    Highest education level: Not on file   Occupational History    Not on file   Social Needs    Financial resource strain: Not on file    Food insecurity     Worry: Not on file     Inability: Not on file    Transportation needs     Medical: Not on file     Non-medical: Not on file   Tobacco Use    Smoking status: Never Smoker    Smokeless tobacco: Never Used   Substance and Sexual Activity    Alcohol use: No    Drug use: No    Sexual activity: Never   Lifestyle    Physical activity     Days per week: Not on file     Minutes per session: Not on file    Stress: Not on file   Relationships    Social connections     Talks on phone: Not on file     Gets together: Not on file     Attends Faith service: Not on file     Active member of club or organization: Not on file     Attends meetings of clubs or organizations: Not on file     Relationship status: Not on file    Intimate partner violence     Fear of current or ex partner: Not on file     Emotionally abused: Not on file     Physically abused: Not on file     Forced sexual activity: Not on file   Other Topics Concern    Not on file   Social History Narrative    Not on file       Alcohol history: Not at all  Smoking history: Non-smoker  Illicit drug history: Not at all    Living arrangement: patient lives with their daughter. Ambulates: Assisted walker    Review of Systems:   Review of Systems   Unable to perform ROS: Dementia        Physical Exam      O2 Device: Room air   General: No acute distress. Alert but appears fatigued    Head: Normocephalic. Atraumatic. Eyes:              Conjunctiva pink. Sclera white. PERRL. Nose:             Septum midline. Mucosa pink. No drainage. Throat: Mucosa pink. Dry mucous membranes. No tonsillar exudates or erythema. Palate movement equal bilaterally. Neck: Supple. Normal ROM. No stiffness. Respiratory: CTAB. No w/r/r/c.   Cardiovascular: RRR. Normal S1,S2. No m/r/g. Pulses 2+ throughout. GI: + bowel sounds. Nontender. No rebound tenderness or guarding. Nondistended. Extremities: 1+ pitting BLE edema. Distal pulses intact. Musculoskeletal: Diminished active ROM. Skin: Warm, dry. No rashes. No evidence of any skin break down or wounds. Neuro: A&O x1. Unable to fully assess CN's 2/2 Pt's poor understanding and lack of cooperation.  + Mild cogwheel rigidity and resting tremor noted in RUE.              Laboratory Data  Recent Results (from the past 8 hour(s))   CBC WITH AUTOMATED DIFF    Collection Time: 06/16/20 12:33 PM   Result Value Ref Range    WBC 8.3 3.6 - 11.0 K/uL    RBC 4.50 3.80 - 5.20 M/uL    HGB 14.1 11.5 - 16.0 g/dL    HCT 41.1 35.0 - 47.0 %    MCV 91.3 80.0 - 99.0 FL    MCH 31.3 26.0 - 34.0 PG    MCHC 34.3 30.0 - 36.5 g/dL    RDW 12.4 11.5 - 14.5 %    PLATELET 855 421 - 486 K/uL    MPV 9.1 8.9 - 12.9 FL    NRBC 0.0 0  WBC    ABSOLUTE NRBC 0.00 0.00 - 0.01 K/uL    NEUTROPHILS 85 (H) 32 - 75 %    LYMPHOCYTES 5 (L) 12 - 49 %    MONOCYTES 7 5 - 13 %    EOSINOPHILS 2 0 - 7 % BASOPHILS 0 0 - 1 %    IMMATURE GRANULOCYTES 1 (H) 0.0 - 0.5 %    ABS. NEUTROPHILS 7.0 1.8 - 8.0 K/UL    ABS. LYMPHOCYTES 0.4 (L) 0.8 - 3.5 K/UL    ABS. MONOCYTES 0.6 0.0 - 1.0 K/UL    ABS. EOSINOPHILS 0.2 0.0 - 0.4 K/UL    ABS. BASOPHILS 0.0 0.0 - 0.1 K/UL    ABS. IMM. GRANS. 0.1 (H) 0.00 - 0.04 K/UL    DF SMEAR SCANNED      RBC COMMENTS NORMOCYTIC, NORMOCHROMIC     METABOLIC PANEL, COMPREHENSIVE    Collection Time: 06/16/20 12:33 PM   Result Value Ref Range    Sodium 129 (L) 136 - 145 mmol/L    Potassium 4.5 3.5 - 5.1 mmol/L    Chloride 98 97 - 108 mmol/L    CO2 26 21 - 32 mmol/L    Anion gap 5 5 - 15 mmol/L    Glucose 121 (H) 65 - 100 mg/dL    BUN 19 6 - 20 MG/DL    Creatinine 1.09 (H) 0.55 - 1.02 MG/DL    BUN/Creatinine ratio 17 12 - 20      GFR est AA 57 (L) >60 ml/min/1.73m2    GFR est non-AA 47 (L) >60 ml/min/1.73m2    Calcium 8.8 8.5 - 10.1 MG/DL    Bilirubin, total 0.4 0.2 - 1.0 MG/DL    ALT (SGPT) 23 12 - 78 U/L    AST (SGOT) 27 15 - 37 U/L    Alk. phosphatase 79 45 - 117 U/L    Protein, total 7.8 6.4 - 8.2 g/dL    Albumin 3.5 3.5 - 5.0 g/dL    Globulin 4.3 (H) 2.0 - 4.0 g/dL    A-G Ratio 0.8 (L) 1.1 - 2.2     MAGNESIUM    Collection Time: 06/16/20 12:33 PM   Result Value Ref Range    Magnesium 2.3 1.6 - 2.4 mg/dL   TSH 3RD GENERATION    Collection Time: 06/16/20 12:33 PM   Result Value Ref Range    TSH 2.16 0.36 - 3.74 uIU/mL   SAMPLES BEING HELD    Collection Time: 06/16/20 12:33 PM   Result Value Ref Range    SAMPLES BEING HELD 1RD,1BL     COMMENT        Add-on orders for these samples will be processed based on acceptable specimen integrity and analyte stability, which may vary by analyte.    URINALYSIS W/ RFLX MICROSCOPIC    Collection Time: 06/16/20  1:39 PM   Result Value Ref Range    Color YELLOW/STRAW      Appearance CLEAR CLEAR      Specific gravity 1.019 1.003 - 1.030      pH (UA) 6.5 5.0 - 8.0      Protein Negative NEG mg/dL    Glucose Negative NEG mg/dL    Ketone 15 (A) NEG mg/dL    Bilirubin Negative NEG      Blood Negative NEG      Urobilinogen 1.0 0.2 - 1.0 EU/dL    Nitrites Negative NEG      Leukocyte Esterase Negative NEG         Imaging  CXR Results  (Last 48 hours)               06/16/20 1137  XR CHEST PORT Final result    Impression:  Impression: No acute process. Narrative: Indication: Lethargy       Comparison: 5/31/2020       Portable exam of the chest obtained at 1131 demonstrates normal heart size. There is no acute process in the lung fields. The osseous structures are   unremarkable. CT Results  (Last 48 hours)               06/16/20 1146  CT HEAD WO CONT Final result    Impression:  IMPRESSION:    No acute process or change compared to the prior exam.               Narrative:  EXAM: CT HEAD WO CONT       INDICATION: lethargy; weakness;       COMPARISON: 5/31/2020. CONTRAST: None. TECHNIQUE: Unenhanced CT of the head was performed using 5 mm images. Brain and   bone windows were generated. Coronal and sagittal reformats. CT dose reduction   was achieved through use of a standardized protocol tailored for this   examination and automatic exposure control for dose modulation. FINDINGS:   Generalized atrophy is again noted. Small vessel ischemic changes are stable   compared to the prior exam. There is no intracranial hemorrhage, extra-axial   collection, or mass effect. The basilar cisterns are open. No CT evidence of   acute infarct. The bone windows demonstrate no abnormalities. The visualized portions of the   paranasal sinuses and mastoid air cells are clear. Vascular calcification is   noted. Assessment and Plan     Li Kelsey is a 80 y.o. female who is admitted for hyponatremia, UTI and deconditioning. Known ho HLD. Hyponatremia:  (). Pt appears dry and noted to have decreased PO intake per family. Mild symptoms of confusion, nausea, gait disturbance.   TSH wnl.    -Admit to remote tele -vitals per unit routine   -Strict I&O   -Start MIVF NS at 100mL/hr  -NPO pending bedside swallow eval   -BMP q4h - do not increase by more than 8mEq/L in 24 hours   - continue close monitoring of sodium   -f/u urine lytes & serum osmo    UTI: known UTI diagnosed 5/31 in ED, UCx grew Aerococcus - difficult to treat (s/p Amoxiclilin, Macrobid and Bactrim). No urinary complaints but having subjective fever at home to 101 with n/v x2 PTA and worsening fatigue. UA POA wnl.    -f/u UCx, BCx, LA   -Compazine for nausea prn   -Tylenol prn for pain and fever   -IV fluids as above   -Ctx 1g q24h pending UCx  -Daily CBC/BMP     Deconditioning 2/2 Dementia: Family has noticed progressive decline over past year. Significant decline in the last 3 weeks. Difficulty transferring Pt and she can no longer perform ADLS. A&O x1 on admission. Family has been having difficulty taking care of Pt at home and requests SNF/LTC. Was supposed to follow up with Dr. Anthony Avery (neurology) outpatient for eval for dementia. POA TSH wnl. CT head and CXR without acute process. -f/u B12, Folate, Lipid  -CM consult for long term care placement   -PT/OT consult   -palliative consulted for goals of care. COVID PUI: for LTC placement only. -f/u COVID       Hyperlipidemia: Last lipid panel on 10/2019 and values were (, , HDL 48, ). Unable to calculate ASCVD risk given Pt's age.    -Hold home Atorvastatin for now given her complaints of weakness.    -Consider stopping at discharge as risks outweigh benefits for patient at this time given her age  -f/u CK & Lipid profile       FEN/GI - MIVF at 100cc/hr NS. PO intake pending bedside swallow. Activity - Ambulate with assistance  DVT prophylaxis - Sub Q Heparin  GI prophylaxis - Not indicated at this time  Fall prophylaxis - Fall precautions ordered. Disposition - Admit to Remote Telemetry. Plan to d/c to TBD.  Consulting PT/OT/CM  Code Status - DNR, discussed with patient / caregivers. Next of Kin Name and Contact Daughter Lui Daniels [phone]    Patient Mora Babinski will be discussed Dr. Ulysses Cower.      2:20 PM, 06/16/20  Nancy Cowden, DO  Family Medicine Resident       For Billing    Chief Complaint   Patient presents with   [facility] Problems  Date Reviewed: 6/8/2020          Codes Class Noted POA    Hyponatremia ICD-10-CM: E87.1  ICD-9-CM: 276.1  6/16/2020 Unknown

## 2020-06-16 NOTE — ROUTINE PROCESS
TRANSFER - OUT REPORT: 
 
Verbal report given to Maria Elena Mercer on [address]  being transferred to [facility] for routine progression of care Report consisted of patients Situation, Background, Assessment and  
Recommendations(SBAR). Information from the following report(s) SBAR, ED Summary, [facility] and Recent Results was reviewed with the receiving nurse. Opportunity for questions and clarification was provided.

## 2020-06-16 NOTE — ED TRIAGE NOTES
Patient presents with her daughter Izzy Davis [phone])  Patients daughter reports the patient started to get sick on the 1st of the month and was diagnosed with a UTI- Patients daughter reports since being seen on the 1st the patient has been placed on multiple ABX and continues to decline. Patients daughter also reports the patient started with vomiting yesterday.

## 2020-06-16 NOTE — PROGRESS NOTES
[address] [place] [address]   Office [phone]  Fax [phone]          Assessment and Plan   Rupali Weinberg is a 80 y.o. female who is admitted for hyponatremia, UTI and deconditioning. Known ho HLD.      24 Hour Events: no acute events overnight. Hyponatremia:  (). Pt appears dry and noted to have decreased PO intake per family. Mild symptoms of confusion, nausea, gait disturbance. TSH wnl. FeNA 0.4% indicating pre-renal etiology consistent with volume depletion.   -Strict I&O   -MIVF NS at 100mL/hr and regular diet  -BMP q4h - do not increase by more than 8mEq/L in 24 hours   - continue close monitoring of sodium      UTI: known UTI diagnosed 5/31 in ED, UCx grew Aerococcus - difficult to treat (s/p Amoxiclilin, Macrobid and Bactrim). No urinary complaints but having subjective fever at home to 101 with n/v x2 PTA and worsening fatigue. UA POA wnl.  LA normal. BCX NGx10 hrs.   -f/u UCx, BCx  -Compazine for nausea prn   -Tylenol prn for pain and fever   -IV fluids as above   -Ctx 1g q24h pending UCx  -Daily CBC/BMP      Deconditioning 2/2 Dementia: Family has noticed progressive decline over past year. Significant decline in the last 3 weeks. Difficulty transferring Pt and she can no longer perform ADLS. A&O x1 on admission. Family has been having difficulty taking care of Pt at home and requests SNF/LTC. Was supposed to follow up with Dr. Anthony Avery (neurology) outpatient for eval for dementia. POA TSH wnl. CT head and CXR without acute process. B12 and folte normal.  -CM consult for long term care placement   -PT/OT consult   -palliative consulted for goals of care.      COVID for Placement: for LTC placement only. -f/u COVID      Hyperlipidemia: Last lipid panel on 10/2019 and values were (, , HDL 48, ). Unable to calculate ASCVD risk given Pt's age.   Lipid panel now , TG 92, HDL 39, LDL 59.  -Hold home Atorvastatin for now given her complaints of weakness.    -Consider stopping at discharge as risks outweigh benefits for patient at this time given her age        FEN/GI - MIVF at 100cc/hr NS.  regular diet. Activity - Ambulate with assistance  DVT prophylaxis - Sub Q Heparin  GI prophylaxis - Not indicated at this time  Fall prophylaxis - Fall precautions ordered. Disposition - Admit to Remote Telemetry. Plan to d/c to SNF then LTC. Consulting PT/OT/CM  Code Status - DNR, discussed with patient / caregivers. Next of Kin Name and Contact Daughter Gilmar Bertrand [phone]     Patient Charlesetta Bumpers will be discussed Dr. Larisa Guerra. I appreciate the opportunity to participate in the care of this patient,  Sushma Erickson DO  Family Medicine Resident         Subjective / Objective     Patient resting comfortably. No complaints. Temp (24hrs), Av.6 °F (37 °C), Min:97.6 °F (36.4 °C), Max:100.4 °F (38 °C)     Objective  Respiratory:   O2 Device: Room air   General: No acute distress. Alert but appears fatigued    Head: Normocephalic. Atraumatic. Eyes:              Conjunctiva pink. Sclera white. PERRL. Nose:             Septum midline. Mucosa pink. No drainage. Throat: Mucosa pink. Dry mucous membranes. No tonsillar exudates or erythema. Palate movement equal bilaterally. Neck: Supple. Normal ROM. No stiffness. Respiratory: CTAB. No w/r/r/c.   Cardiovascular: RRR. Normal S1,S2. No m/r/g. Pulses 2+ throughout. GI: + bowel sounds. Nontender. No rebound tenderness or guarding. Nondistended. Extremities: No LE edema. Distal pulses intact. Musculoskeletal: Diminished active ROM. Skin: Warm, dry. No rashes. No evidence of any skin break down or wounds. Neuro: A&O x1. Unable to fully assess CN's 2/2 Pt's poor understanding and lack of cooperation.  + Mild cogwheel rigidity and resting tremor noted in RUE.         I/O:  Date 20 07 - 20 0659 20 07 - 20 0659   Shift 9384-1274 7837-9577 24 Hour Total 5781-7531 5101-9557 24 Hour Total   INTAKE   I.V.(mL/kg/hr) 60(0.1) 1350 1410        I.V.  1300 1300        Volume (0.9% sodium chloride infusion) 60  60        Volume (cefTRIAXone (ROCEPHIN) 1 g in 0.9% sodium chloride (MBP/ADV) 50 mL)  50 50      Shift Total(mL/kg) 60(1) 1350(22.2) 1410(23.2)      OUTPUT   Urine(mL/kg/hr)           Urine Occurrence(s)  3 x 3 x      Shift Total(mL/kg)         NET 60 1350 1410      Weight (kg) 60.8 60.8 60.8 60.8 60.8 60.8       Inpatient Medications  Current Facility-Administered Medications   Medication Dose Route Frequency    sodium chloride (NS) flush 5-40 mL  5-40 mL IntraVENous Q8H    sodium chloride (NS) flush 5-40 mL  5-40 mL IntraVENous PRN    acetaminophen (TYLENOL) tablet 650 mg  650 mg Oral Q4H PRN    heparin (porcine) injection 5,000 Units  5,000 Units SubCUTAneous Q8H    prochlorperazine (COMPAZINE) injection 10 mg  10 mg IntraVENous Q6H PRN    cefTRIAXone (ROCEPHIN) 1 g in 0.9% sodium chloride (MBP/ADV) 50 mL  1 g IntraVENous Q24H         Allergies  Allergies   Allergen Reactions    Codeine Nausea Only         CBC:  Recent Labs     06/17/20  0148 06/16/20  1233   WBC 5.9 8.3   HGB 12.7 14.1   HCT 36.7 41.1    998       Metabolic Panel:  Recent Labs     06/17/20  0148 06/16/20  2116 06/16/20  1625 06/16/20  1233   * 131* 130* 129*   K 3.9 4.1 4.2 4.5    99 99 98   CO2 25 24 24 26   BUN 16 18 18 19   CREA 0.85 0.87 0.97 1.09*   GLU 97 105* 115* 121*   CA 7.9* 8.1* 8.3* 8.8   MG  --   --   --  2.3   ALB  --   --   --  3.5   ALT  --   --   --  23           Imaging/procedures:    CXR Results  (Last 48 hours)               06/16/20 1137  XR CHEST PORT Final result    Impression:  Impression: No acute process. Narrative: Indication: Lethargy       Comparison: 5/31/2020       Portable exam of the chest obtained at 1131 demonstrates normal heart size. There is no acute process in the lung fields.  The osseous structures are   unremarkable. CT Results  (Last 48 hours)               06/16/20 1146  CT HEAD WO CONT Final result    Impression:  IMPRESSION:    No acute process or change compared to the prior exam.               Narrative:  EXAM: CT HEAD WO CONT       INDICATION: lethargy; weakness;       COMPARISON: 5/31/2020. CONTRAST: None. TECHNIQUE: Unenhanced CT of the head was performed using 5 mm images. Brain and   bone windows were generated. Coronal and sagittal reformats. CT dose reduction   was achieved through use of a standardized protocol tailored for this   examination and automatic exposure control for dose modulation. FINDINGS:   Generalized atrophy is again noted. Small vessel ischemic changes are stable   compared to the prior exam. There is no intracranial hemorrhage, extra-axial   collection, or mass effect. The basilar cisterns are open. No CT evidence of   acute infarct. The bone windows demonstrate no abnormalities. The visualized portions of the   paranasal sinuses and mastoid air cells are clear. Vascular calcification is   noted.                  .     For Billing    Chief Complaint   Patient presents with   [facility] Problems  Date Reviewed: 6/8/2020          Codes Class Noted POA    Hyponatremia ICD-10-CM: E87.1  ICD-9-CM: 276.1  6/16/2020 Unknown

## 2020-06-16 NOTE — PROGRESS NOTES
6/16/2020  3:59 PM  Pt admitting for hyponatremia, PT/OT evals ordered, possible SNF at d/c. Per Dr Ramila Tariq family seeking LTC placement,as they are nop longer ab;le to care for her pt already has Medicaid. I called [facility], they have LTC beds available, pt would need UAI completed. CM attempted to call pt's Dtr, Reji Toussaint [phone], lvm.   Jordi Virk

## 2020-06-16 NOTE — ED PROVIDER NOTES
HPI     Pt is a 80 y.o. F with PMH of dementia, OA here with her daughter for generalized weakness and lethargy. Pt has had increased weakness over few weeks. She was diagnosed with UTI and tried on 3 antibiotics (amoxicillin, macrobid, and then Bactrim DS). Now she is unable to take antibiotics 2/2 to vomiting. Her daughter says she hasn't eaten since yesterday morning either. Pt c/o pain some mornings but not able to verbalize any pain or complaint now but all history per daughter. No other known complaints at this time. Past Medical History:   Diagnosis Date    ACP (advance care planning) 3/22/2018    The patient brought in her signed advance directive today (3/22/18).     Hypercholesterolemia 9/5/2019    Joint pain     Osteoarthritis     Osteopenia of multiple sites 10/31/2019    Osteoporosis     Schatzki's ring     Vitamin D deficiency 7/8/2018       Past Surgical History:   Procedure Laterality Date    COLONOSCOPY N/A 7/5/2018    COLONOSCOPY (LATEX ALLERGY) performed by Saran Canas MD at [facility] AMBULATORY OR    HX COLONOSCOPY      HX GI      Schatzkis ring dilitation    HX GYN      3 vaginal births    HX HEENT      bilateral cataracts    HX OTHER SURGICAL      Schatzki Ring Dilation         Family History:   Problem Relation Age of Onset    Hypertension Mother     Stroke Father     Lung Disease Brother     Cancer Brother         lung    Crohn's Disease Daughter     Thyroid Disease Daughter     Diabetes Son        Social History     Socioeconomic History    Marital status: SINGLE     Spouse name: Not on file    Number of children: Not on file    Years of education: Not on file    Highest education level: Not on file   Occupational History    Not on file   Social Needs    Financial resource strain: Not on file    Food insecurity     Worry: Not on file     Inability: Not on file    Transportation needs     Medical: Not on file     Non-medical: Not on file   Tobacco Use  Smoking status: Never Smoker    Smokeless tobacco: Never Used   Substance and Sexual Activity    Alcohol use: No    Drug use: No    Sexual activity: Never   Lifestyle    Physical activity     Days per week: Not on file     Minutes per session: Not on file    Stress: Not on file   Relationships    Social connections     Talks on phone: Not on file     Gets together: Not on file     Attends Yazidism service: Not on file     Active member of club or organization: Not on file     Attends meetings of clubs or organizations: Not on file     Relationship status: Not on file    Intimate partner violence     Fear of current or ex partner: Not on file     Emotionally abused: Not on file     Physically abused: Not on file     Forced sexual activity: Not on file   Other Topics Concern    Not on file   Social History Narrative    Not on file         ALLERGIES: Codeine    Review of Systems   Unable to perform ROS: Dementia       Vitals:    06/16/20 1015   BP: 147/83   Pulse: (!) 105   Resp: 16   Temp: 99 °F (37.2 °C)   SpO2: 94%   Weight: 60.8 kg (134 lb)   Height: 5' (1.524 m)            Physical Exam  Vitals signs reviewed. Constitutional:       General: She is not in acute distress. Appearance: She is well-developed. She is not diaphoretic. HENT:      Head: Normocephalic and atraumatic. Mouth/Throat:      Mouth: Mucous membranes are dry. Eyes:      Pupils: Pupils are equal, round, and reactive to light. Neck:      Musculoskeletal: Normal range of motion. Cardiovascular:      Rate and Rhythm: Normal rate. Pulmonary:      Effort: Pulmonary effort is normal.   Abdominal:      General: There is no distension. Musculoskeletal:         General: No swelling. Right lower leg: No edema. Left lower leg: No edema. Skin:     General: Skin is warm. Capillary Refill: Capillary refill takes less than 2 seconds. Neurological:      Mental Status: She is alert.       Sensory: No sensory deficit. Comments: Unable to assess gait. Generally weak   Psychiatric:      Comments: Unable to assess. Pt not interacting verbally but does follow some commands          MDM       Procedures    Hospitalist Perfect Serve for Admission  1:46 PM to family medicine resident. ED Room Number: ER12/12  Patient Name and age:  Briana Henley 80 y.o.  female  Working Diagnosis:   1. Lethargy    2. Hyponatremia        COVID-19 Suspicion:  no    Code Status:  DNR  Readmission: no  Isolation Requirements:  no  Recommended Level of Care:  med/surg  Department:[facility] ED - [phone]  Other:  Pt with increased lethargy, decreased PO intake, frequent UTI here with concerns for failure to thrive. Imaging normal.  Labs with hyponatremia, IVF started and urine pending.       Pt accepted by family medicine team.    Eric Brown MD

## 2020-06-16 NOTE — PROGRESS NOTES
Problem: Mobility Impaired (Adult and Pediatric)  Goal: *Acute Goals and Plan of Care (Insert Text)  Description: FUNCTIONAL STATUS PRIOR TO ADMISSION: Since June 1st pt has required A x 2 for transfers and has been non-ambulatory. HOME SUPPORT PRIOR TO ADMISSION: The patient lived with daughter and son in law. Physical Therapy Goals  Initiated 6/16/2020  1. Patient will move from supine to sit and sit to supine  in bed with maximal assistance within 7 day(s). 2.  Patient will transfer from bed to chair and chair to bed with maximal assistance using the least restrictive device within 7 day(s). 3.  Patient will perform sit to stand with maximal assistance within 7 day(s). 4.  Patient will ambulate with maximal assistance for 5 feet with the least restrictive device within 7 day(s). Outcome: Progressing Towards Goal   PHYSICAL THERAPY EVALUATION  Patient: Briana Henley (46 y.o. female)  Date: 6/16/2020  Primary Diagnosis: Hyponatremia [E87.1]        Precautions:          ASSESSMENT  Based on the objective data described below, the patient presents with global weakness, confusion, impaired command following, poor sitting balance and overall MAX A x 2 for all mobility. Daughter present to provide history. June 1st pt was diagnosed with a UTI. Overnight she was no longer able to walk to the bathroom and now requires A x 2(daughter and her ) to transfer OOB. [place] of care is becoming too much for family at this time. Hopeful with SNF stay pt will improve functionally however if no progress is made they will need LTC placement. Current Level of Function Impacting Discharge (mobility/balance): Max A x 2    Functional Outcome Measure: The patient scored 0/28 on the Tinetti outcome measure which is indicative of high fall risk . Other factors to consider for discharge: dementia? Patient will benefit from skilled therapy intervention to address the above noted impairments. PLAN :  Recommendations and Planned Interventions: bed mobility training, transfer training, gait training, therapeutic exercises, neuromuscular re-education, patient and family training/education, and therapeutic activities      Frequency/Duration: Patient will be followed by physical therapy:  5 times a week to address goals. Recommendation for discharge: (in order for the patient to meet his/her long term goals)  Therapy up to 5 days/week in SNF setting    This discharge recommendation:  Has been made in collaboration with the attending provider and/or case management    IF patient discharges home will need the following DME: patient owns DME required for discharge         SUBJECTIVE:   Patient stated non-verbal.    OBJECTIVE DATA SUMMARY:   HISTORY:    Past Medical History:   Diagnosis Date    ACP (advance care planning) 3/22/2018    The patient brought in her signed advance directive today (3/22/18). Hypercholesterolemia 9/5/2019    Joint pain     Osteoarthritis     Osteopenia of multiple sites 10/31/2019    Osteoporosis     Schatzki's ring     Vitamin D deficiency 7/8/2018     Past Surgical History:   Procedure Laterality Date    COLONOSCOPY N/A 7/5/2018    COLONOSCOPY (LATEX ALLERGY) performed by Bianca Chaudhry MD at [facility] AM[facility]ULATORY [facility]    HX COLONOSCOPY      HX GI      Schatzkis ring dilitation    HX GYN      3 vaginal births    HX HEENT      bilateral cataracts    HX OTHER SURGICAL      Schatzki Ring Dilation       Personal factors and/or comorbidities impacting plan of care: OA, joint pain, dementia?     Home Situation  Home Environment: Private residence  One/Two Story Residence: Two story, live on 1st floor  Living Alone: No  Support Systems: Family member(s)  Patient Expects to be Discharged to[de-identified] Private residence  Current DME Used/Available at Home: Faustino Sample, rolling(transport chair)    EXAMINATION/PRESENTATION/DECISION MAKING:   Critical Behavior:              Hearing:     Skin:    Edema: Range Of Motion:  AROM: Grossly decreased, non-functional           PROM: Generally decreased, functional           Strength:    Strength: Grossly decreased, non-functional                    Tone & Sensation:   Tone: Normal                              Coordination:  Coordination: Grossly decreased, non-functional  Vision:      Functional Mobility:  Bed Mobility:  Rolling: Maximum assistance;Assist x2  Supine to Sit: Maximum assistance;Assist x2  Sit to Supine: Maximum assistance;Assist x2     Transfers:                             Balance:   Sitting: Impaired  Sitting - Static: Poor (constant support)  Sitting - Dynamic: Poor (constant support)  Ambulation/Gait Training:                       Functional Measure:  Tinetti test:    Sitting Balance: 0  Arises: 0  Attempts to Rise: 0  Immediate Standing Balance: 0  Standing Balance: 0  Nudged: 0  Eyes Closed: 0  Turn 360 Degrees - Continuous/Discontinuous: 0  Turn 360 Degrees - Steady/Unsteady: 0  Sitting Down: 0  Balance Score: 0 Balance total score  Indication of Gait: 0  R Step Length/Height: 0  L Step Length/Height: 0  R Foot Clearance: 0  L Foot Clearance: 0  Step Symmetry: 0  Step Continuity: 0  Path: 0  Trunk: 0  Walking Time: 0  Gait Score: 0 Gait total score  Total Score: 0/28 Overall total score         Tinetti Tool Score Risk of Falls  <19 = High Fall Risk  19-24 = Moderate Fall Risk  25-28 = Low Fall Risk  Tinetti ME. Performance-Oriented Assessment of Mobility Problems in Elderly Patients. Wright 66; [id number].  (Scoring Description: PT Bulletin Feb. 10, 1993)    Older adults: Bhaskar Segura et al, 2009; n = 1000 [facility] elderly evaluated with ABC, JEFF, ADL, and IADL)  · Mean JEFF score for males aged 69-68 years = 26.21(3.40)  · Mean JEFF score for females age 69-68 years = 25.16(4.30)  · Mean JEFF score for males over 80 years = 23.29(6.02)  · Mean JEFF score for females over 80 years = 17.20(8.32)            Physical Therapy Evaluation Charge Determination History Examination Presentation Decision-Making   MEDIUM  Complexity : 1-2 comorbidities / personal factors will impact the outcome/ POC  MEDIUM Complexity : 3 Standardized tests and measures addressing body structure, function, activity limitation and / or participation in recreation  LOW Complexity : Stable, uncomplicated  Other outcome measures Tinetti  LOW       Based on the above components, the patient evaluation is determined to be of the following complexity level: LOW       Activity Tolerance:   Poor  Please refer to the flowsheet for vital signs taken during this treatment. After treatment patient left in no apparent distress:   Supine in bed, Call bell within reach, and Caregiver / family present    COMMUNICATION/EDUCATION:   The patients plan of care was discussed with: Registered nurse. Fall prevention education was provided and the patient/caregiver indicated understanding., Patient/family have participated as able in goal setting and plan of care. , and Patient/family agree to work toward stated goals and plan of care.     Thank you for this referral.  Abimbola Robbins, PT   Time Calculation: 30 mins

## 2020-06-17 LAB
ALBUMIN SERPL-MCNC: 3.2 G/DL (ref 3.5–5)
ALBUMIN/GLOB SERPL: 0.9 {RATIO} (ref 1.1–2.2)
ALP SERPL-CCNC: 77 U/L (ref 45–117)
ALT SERPL-CCNC: 20 U/L (ref 12–78)
ANION GAP SERPL CALC-SCNC: 6 MMOL/L (ref 5–15)
ANION GAP SERPL CALC-SCNC: 8 MMOL/L (ref 5–15)
AST SERPL-CCNC: 26 U/L (ref 15–37)
ATRIAL RATE: 102 BPM
BACTERIA SPEC CULT: NORMAL
BASOPHILS # BLD: 0 K/UL (ref 0–0.1)
BASOPHILS NFR BLD: 0 % (ref 0–1)
BILIRUB SERPL-MCNC: 0.4 MG/DL (ref 0.2–1)
BUN SERPL-MCNC: 15 MG/DL (ref 6–20)
BUN SERPL-MCNC: 16 MG/DL (ref 6–20)
BUN/CREAT SERPL: 19 (ref 12–20)
BUN/CREAT SERPL: 21 (ref 12–20)
CALCIUM SERPL-MCNC: 7.9 MG/DL (ref 8.5–10.1)
CALCIUM SERPL-MCNC: 8.1 MG/DL (ref 8.5–10.1)
CALCULATED P AXIS, ECG09: 43 DEGREES
CALCULATED R AXIS, ECG10: -20 DEGREES
CALCULATED T AXIS, ECG11: -61 DEGREES
CHLORIDE SERPL-SCNC: 101 MMOL/L (ref 97–108)
CHLORIDE SERPL-SCNC: 101 MMOL/L (ref 97–108)
CO2 SERPL-SCNC: 23 MMOL/L (ref 21–32)
CO2 SERPL-SCNC: 25 MMOL/L (ref 21–32)
CREAT SERPL-MCNC: 0.7 MG/DL (ref 0.55–1.02)
CREAT SERPL-MCNC: 0.85 MG/DL (ref 0.55–1.02)
DIAGNOSIS, 93000: NORMAL
DIFFERENTIAL METHOD BLD: ABNORMAL
EOSINOPHIL # BLD: 0.2 K/UL (ref 0–0.4)
EOSINOPHIL NFR BLD: 3 % (ref 0–7)
ERYTHROCYTE [DISTWIDTH] IN BLOOD BY AUTOMATED COUNT: 12.3 % (ref 11.5–14.5)
GLOBULIN SER CALC-MCNC: 3.5 G/DL (ref 2–4)
GLUCOSE SERPL-MCNC: 101 MG/DL (ref 65–100)
GLUCOSE SERPL-MCNC: 97 MG/DL (ref 65–100)
HCT VFR BLD AUTO: 36.7 % (ref 35–47)
HGB BLD-MCNC: 12.7 G/DL (ref 11.5–16)
IMM GRANULOCYTES # BLD AUTO: 0 K/UL (ref 0–0.04)
IMM GRANULOCYTES NFR BLD AUTO: 1 % (ref 0–0.5)
LYMPHOCYTES # BLD: 0.8 K/UL (ref 0.8–3.5)
LYMPHOCYTES NFR BLD: 13 % (ref 12–49)
MCH RBC QN AUTO: 31.5 PG (ref 26–34)
MCHC RBC AUTO-ENTMCNC: 34.6 G/DL (ref 30–36.5)
MCV RBC AUTO: 91.1 FL (ref 80–99)
MONOCYTES # BLD: 0.8 K/UL (ref 0–1)
MONOCYTES NFR BLD: 14 % (ref 5–13)
NEUTS SEG # BLD: 4.1 K/UL (ref 1.8–8)
NEUTS SEG NFR BLD: 70 % (ref 32–75)
NRBC # BLD: 0 K/UL (ref 0–0.01)
NRBC BLD-RTO: 0 PER 100 WBC
P-R INTERVAL, ECG05: 140 MS
PLATELET # BLD AUTO: 256 K/UL (ref 150–400)
PMV BLD AUTO: 8.8 FL (ref 8.9–12.9)
POTASSIUM SERPL-SCNC: 3.9 MMOL/L (ref 3.5–5.1)
POTASSIUM SERPL-SCNC: 4.1 MMOL/L (ref 3.5–5.1)
PROT SERPL-MCNC: 6.7 G/DL (ref 6.4–8.2)
Q-T INTERVAL, ECG07: 338 MS
QRS DURATION, ECG06: 84 MS
QTC CALCULATION (BEZET), ECG08: 440 MS
RBC # BLD AUTO: 4.03 M/UL (ref 3.8–5.2)
SERVICE CMNT-IMP: NORMAL
SODIUM SERPL-SCNC: 132 MMOL/L (ref 136–145)
SODIUM SERPL-SCNC: 132 MMOL/L (ref 136–145)
VENTRICULAR RATE, ECG03: 102 BPM
WBC # BLD AUTO: 5.9 K/UL (ref 3.6–11)

## 2020-06-17 PROCEDURE — 97165 OT EVAL LOW COMPLEX 30 MIN: CPT

## 2020-06-17 PROCEDURE — 87635 SARS-COV-2 COVID-19 AMP PRB: CPT

## 2020-06-17 PROCEDURE — 97535 SELF CARE MNGMENT TRAINING: CPT

## 2020-06-17 PROCEDURE — 80053 COMPREHEN METABOLIC PANEL: CPT

## 2020-06-17 PROCEDURE — 94760 N-INVAS EAR/PLS OXIMETRY 1: CPT

## 2020-06-17 PROCEDURE — 36415 COLL VENOUS BLD VENIPUNCTURE: CPT

## 2020-06-17 PROCEDURE — 74011250636 HC RX REV CODE- 250/636: Performed by: STUDENT IN AN ORGANIZED HEALTH CARE EDUCATION/TRAINING PROGRAM

## 2020-06-17 PROCEDURE — 85025 COMPLETE CBC W/AUTO DIFF WBC: CPT

## 2020-06-17 PROCEDURE — 77030038269 HC DRN EXT URIN PURWCK BARD -A

## 2020-06-17 PROCEDURE — 65660000000 HC RM CCU STEPDOWN

## 2020-06-17 PROCEDURE — 97166 OT EVAL MOD COMPLEX 45 MIN: CPT

## 2020-06-17 PROCEDURE — 97530 THERAPEUTIC ACTIVITIES: CPT

## 2020-06-17 RX ADMIN — HEPARIN SODIUM 5000 UNITS: 5000 INJECTION INTRAVENOUS; SUBCUTANEOUS at 10:33

## 2020-06-17 RX ADMIN — HEPARIN SODIUM 5000 UNITS: 5000 INJECTION INTRAVENOUS; SUBCUTANEOUS at 16:33

## 2020-06-17 RX ADMIN — HEPARIN SODIUM 5000 UNITS: 5000 INJECTION INTRAVENOUS; SUBCUTANEOUS at 01:00

## 2020-06-17 RX ADMIN — Medication 5 ML: at 00:21

## 2020-06-17 RX ADMIN — Medication 10 ML: at 16:34

## 2020-06-17 NOTE — ROUTINE PROCESS
Bedside shift change report given to Neeraj Erazo RN (oncoming nurse) by Daniel Duvall RN (offgoing nurse). Report included the following information SBAR and Kardex.

## 2020-06-17 NOTE — PROGRESS NOTES
Pt assessed. Is verbal but voice soft/whispers. Order noted for bmp q4h. Sodium level increasing. Has ivf ordered at 100ml/hr and pt tolerating well. Breath sounds diminished. No edema noted. Slight trace to both lower extremities. Pt without complaints of pain or discomforts voiced. Skin intact. Plan of care was established. Monitor pt status, maintain comfort, provide emotional support.

## 2020-06-17 NOTE — PROGRESS NOTES
Spiritual Care Assessment/Progress Note  [facility]      NAME: Heather Buitrago      MRN: [id number]  AGE: 80 y.o. SEX: female  Quaker Affiliation: No preference   Language: English     6/17/2020     Total Time (in minutes): 7     Spiritual Assessment begun in [facility] 5M1 MED SURG 1 through conversation with:         [x]Patient        [] Family    [] Friend(s)        Reason for Consult: Palliative Care, Initial/Spiritual Assessment     Spiritual beliefs: (Please include comment if needed)     [] Identifies with a che tradition:         [] Supported by a che community:            [x] Claims no spiritual orientation:           [] Seeking spiritual identity:                [] Adheres to an individual form of spirituality:           [] Not able to assess:                           Identified resources for coping:      [] Prayer                               [] Music                  [] Guided Imagery     [x] Family/friends                 [] Pet visits     [] Devotional reading                         [] Unknown     [] Other:                                               Interventions offered during this visit: (See comments for more details)    Patient Interventions: Catharsis/review of pertinent events in supportive environment           Plan of Care:     [] Support spiritual and/or cultural needs    [] Support AMD and/or advance care planning process      [] Support grieving process   [] Coordinate Rites and/or Rituals    [] Coordination with community clergy   [] No spiritual needs identified at this time   [] Detailed Plan of Care below (See Comments)  [] Make referral to Music Therapy  [] Make referral to Pet Therapy     [] Make referral to Addiction services  [] Make referral to [facility]  [] Make referral to Spiritual Care Partner  [] No future visits requested        [x] Follow up visits as needed     Comments:  visited Mrs. Mcmanus for a palliative care initial spiritual assessment on the [facility]. Mrs. Jonathan Davis was awake, alert with some confusion, and was sitting up in bed. Her daughter was present at the bedside.  introduced herself and extended support. Mrs. Jonathan Davis made some eye contact and spoke in soft and low tones. She looked to her daughter to answer questions. Per her daughter, Mrs. Jonathan Davis does not have specific Anabaptism and/or spiritual beliefs that she practices at this time. Her daughter thanked the  for stopping by and is aware of the 's availability. 's will follow up as able and/or needed  [place]. Taya Pickard.      Paging Service: [phone])

## 2020-06-17 NOTE — PROGRESS NOTES
Problem: Falls - Risk of  Goal: *Absence of Falls  Description: Document Pawel Sol Fall Risk and appropriate interventions in the flowsheet.   Outcome: Progressing Towards Goal  Note: Fall Risk Interventions:  Mobility Interventions: Bed/chair exit alarm, Communicate number of staff needed for ambulation/transfer, Mechanical lift    Mentation Interventions: Bed/chair exit alarm, Door open when patient unattended    Medication Interventions: Evaluate medications/consider consulting pharmacy, Bed/chair exit alarm    Elimination Interventions: Bed/chair exit alarm

## 2020-06-17 NOTE — PROGRESS NOTES
Problem: Mobility Impaired (Adult and Pediatric)  Goal: *Acute Goals and Plan of Care (Insert Text)  Description: FUNCTIONAL STATUS PRIOR TO ADMISSION: Since June 1st pt has required A x 2 for transfers and has been non-ambulatory. HOME SUPPORT PRIOR TO ADMISSION: The patient lived with daughter and son in law. Physical Therapy Goals  Initiated 6/16/2020  1. Patient will move from supine to sit and sit to supine  in bed with maximal assistance within 7 day(s). 2.  Patient will transfer from bed to chair and chair to bed with maximal assistance using the least restrictive device within 7 day(s). 3.  Patient will perform sit to stand with maximal assistance within 7 day(s). 4.  Patient will ambulate with maximal assistance for 5 feet with the least restrictive device within 7 day(s). Note:   PHYSICAL THERAPY TREATMENT  Patient: Marlys Velez (60 y.o. female)  Date: 6/17/2020  Diagnosis: Hyponatremia [E87.1]   Hyponatremia       Precautions:    Chart, physical therapy assessment, plan of care and goals were reviewed. ASSESSMENT  Patient continues with skilled PT services. Pt comes to sit with max to total assist of 2. Pt mod to max assist to maintain sitting balance with intervals of min assist sitting with cues. Pt max of 2 sit to stand hand held of 2. Pt stood briefly with max of 2 as she was cleaned up and pads were changed. Pt back to bed max to total assist of 2. Pt progress slow. Continue goals. Current Level of Function Impacting Discharge (mobility/balance):Pt is max to total assist of 2 for mobility. PLAN :  Patient continues to benefit from skilled intervention to address the above impairments. Continue treatment per established plan of care. to address goals.     Recommendation for discharge: (in order for the patient to meet his/her long term goals)  Therapy up to 5 days/week in SNF setting    This discharge recommendation:  Has been made in collaboration with the attending provider and/or case management    IF patient discharges home will need the following DME: to be determined (TBD)       SUBJECTIVE:       OBJECTIVE DATA SUMMARY:   Critical Behavior:  Neurologic State: Alert, Confused, Restless  Orientation Level: Oriented to person, Disoriented to place, Disoriented to situation, Disoriented to time  Cognition: Decreased attention/concentration, Decreased command following, Memory loss, Poor safety awareness, Impaired decision making     Functional Mobility Training:  Bed Mobility:     Supine to Sit: Maximum assistance; Total assistance;Assist x2  Sit to Supine: Maximum assistance; Total assistance;Assist x2       Balance:  Sitting: Impaired; With support  Sitting - Static: Fair (occasional); Poor (constant support)  Standing: Impaired; With support  Standing - Static: Constant support                  Activity Tolerance:   Fair  Please refer to the flowsheet for vital signs taken during this treatment.     After treatment patient left in no apparent distress:   Supine in bed    COMMUNICATION/COLLABORATION:   The patients plan of care was discussed with: Physical therapist.     Nilesh Humphrey PTA   Time Calculation: 38 mins

## 2020-06-17 NOTE — PROGRESS NOTES
Bedside report given to Neema Ferguson RN, oncoming shift. Review of SBAR, Kardex, Plan of care, disposition of patient post discharge. Orders reviewed. Opportunity given to ask questions and answered.

## 2020-06-17 NOTE — PROGRESS NOTES
Transition of Care Plan - RUR 11%                    1. CM to follow through for treatment/response  2. R/O COVID for placement  3. D/C when stable  Plan A: SNF for rehab, transition to LTC, [facility], awaiting response. Will need a [facility]  Plan B: LTC, pt has Medicaid, will need a [facility]  4.  Transport TBD will likely need ila Levy LCSW

## 2020-06-17 NOTE — PROGRESS NOTES
Problem: Self Care Deficits Care Plan (Adult)  Goal: *Acute Goals and Plan of Care (Insert Text)  Description: FUNCTIONAL STATUS PRIOR TO ADMISSION: Since June 1st pt has required A x 2 for transfers and has been non-ambulatory. Per chart, pt requires assistance for all ADLs, however unclear how much assistance. HOME SUPPORT PRIOR TO ADMISSION: The patient lived with daughter and son in law. Occupational Therapy Goals  Initiated 6/17/2020  1. Patient will perform self-feeding with minimal assistance/contact guard assist within 7 day(s). 2.  Patient will perform grooming, in supported sitting, with minimal assistance/contact guard assist within 7 day(s). 3.  Patient will tolerate sitting EOB for 10 minutes with CGA to perform functional task within 7 day(s). 4.  Patient will perform rolling in bed to R/L with mod A to assist with toileting hygiene within 7 day(s). 5.  Patient will perform upper body dressing and bathing, seated, with minimal assistance within 7 days. 6.  Patient will participate in upper extremity therapeutic exercise/activities with minimal assistance/contact guard assist for 5 minutes within 7 day(s). Outcome: Progressing Towards Goal     OCCUPATIONAL THERAPY EVALUATION  Patient: Karen Ag (64 y.o. female)  Date: 6/17/2020  Primary Diagnosis: Hyponatremia [E87.1]        Precautions: Fall       ASSESSMENT  Based on the objective data described below, the patient presents with global weakness, impaired balance, decreased command following, confusion, and decreased activity tolerance impacting ADL performance and mobility following admission for hyponatremia. Pt is able to tolerate sitting at EOB and requires near constant support due to poor balance, with R lateral and anterior lean. With support, she is able to hold onto cup and bring to her mouth with min A, but fatigues quickly. Pt requires A x 2 for all mobility and is able to stand briefly for toileting hygiene. She is minimally responsive to questions, therefore unclear PLOF. Per chart, pt is functioning below her baseline and burden of care is becoming too much for family at this time. Hopeful with stay at SNF pt will be able to improve, however if no progress may need to consider LTC placement. Current Level of Function Impacting Discharge (ADLs/self-care): A x 2 for mobility; total A for lower body dressing and toileting; mod to max A upper body ADLs; mod A grooming    Functional Outcome Measure: The patient scored Total: 0/100 on the Barthel Index outcome measure which is indicative of 100% impaired ability to care for basic self needs/dependency on others; inferred 100% dependency on others for instrumental ADLs. Other factors to consider for discharge: burden of care; advanced age; confusion; A x 2 for mobility; high fall risk     Patient will benefit from skilled therapy intervention to address the above noted impairments. PLAN :  Recommendations and Planned Interventions: self care training, functional mobility training, therapeutic exercise, balance training, therapeutic activities, endurance activities, patient education, home safety training and family training/education    Frequency/Duration: Patient will be followed by occupational therapy 4 times a week to address goals. Recommendation for discharge: (in order for the patient to meet his/her long term goals)  Therapy up to 5 days/week in SNF setting with potential for LTC placement    This discharge recommendation:  Has not yet been discussed the attending provider and/or case management    IF patient discharges home will need the following DME: TBD       SUBJECTIVE:   Patient stated I can't.     OBJECTIVE DATA SUMMARY:   HISTORY:   Past Medical History:   Diagnosis Date    ACP (advance care planning) 3/22/2018    The patient brought in her signed advance directive today (3/22/18).     Hypercholesterolemia 9/5/2019    Joint pain     Osteoarthritis     Osteopenia of multiple sites 10/31/2019    Osteoporosis     Schatzki's ring     Vitamin D deficiency 7/8/2018     Past Surgical History:   Procedure Laterality Date    COLONOSCOPY N/A 7/5/2018    COLONOSCOPY (LATEX ALLERGY) performed by Juwan Pleitez MD at [facility] AMBULATORY OR    HX COLONOSCOPY      HX GI      Schatzkis ring dilitation    HX GYN      3 vaginal births    HX HEENT      bilateral cataracts    HX OTHER SURGICAL      Schatzki Ring Dilation       Expanded or extensive additional review of patient history:     Home Situation  Home Environment: Private residence  One/Two Story Residence: Two story, live on 1st floor  Living Alone: No  Support Systems: Family member(s)  Patient Expects to be Discharged to[de-identified] Private residence  Current DME Used/Available at Home: art Hastings(transport chair)    Hand dominance: Right    EXAMINATION OF PERFORMANCE DEFICITS:  Cognitive/Behavioral Status:  Neurologic State: Alert;Confused  Orientation Level: Oriented to person;Disoriented to place; Disoriented to situation;Disoriented to time  Cognition: Decreased attention/concentration;Decreased command following  Perception: Cues to maintain midline in sitting;Cues to maintain midline in standing; Tactile;Verbal;Visual  Perseveration: No perseveration noted  Safety/Judgement: Decreased awareness of environment;Decreased awareness of need for assistance;Decreased awareness of need for safety    Hearing: Auditory  Auditory Impairment: None     Range of Motion:  AROM: Grossly decreased, non-functional    Strength:  Strength: Grossly decreased, non-functional    Coordination:  Coordination: Generally decreased, functional  Fine Motor Skills-Upper: Left Impaired;Right Impaired    Gross Motor Skills-Upper: Left Impaired;Right Impaired    Tone:  Tone: Normal    Balance:  Sitting: Impaired; With support  Sitting - Static: Fair (occasional); Poor (constant support)  Sitting - Dynamic: Poor (constant support)  Standing: Impaired; With support  Standing - Static: Constant support;Poor    Functional Mobility and Transfers for ADLs:  Bed Mobility:  Supine to Sit: Maximum assistance; Total assistance;Assist x2  Sit to Supine: Maximum assistance; Total assistance;Assist x2    Transfers:  Sit to Stand: Maximum assistance;Assist x2  Toilet Transfer : Maximum assistance;Assist x2(infer based on observations)    ADL Assessment:  Feeding: Moderate assistance;Maximum assistance(due to impaired coordination and weakness; proximal support)    Oral Facial Hygiene/Grooming: Maximum assistance(due to coordination and weakness)    Bathing: Maximum assistance; Total assistance    Upper Body Dressing: Moderate assistance;Maximum assistance(due to weakness and impaired ROM)    Lower Body Dressing: Total assistance    Toileting: Total assistance    ADL Intervention and task modifications: Toileting  Toileting Assistance: Total assistance(dependent)  Bowel Hygiene: Total assistance (dependent)(A x 2; requires bilat UE support in standing)  Clothing Management: Total assistance (dependent)  Cues: Tactile cues provided;Verbal cues provided;Visual cues provided(physical assist for hygiene)    Cognitive Retraining  Safety/Judgement: Decreased awareness of environment;Decreased awareness of need for assistance;Decreased awareness of need for safety     Functional Measure:  Barthel Index:    Bathin  Bladder: 0  Bowels: 0  Groomin  Dressin  Feedin  Mobility: 0  Stairs: 0  Toilet Use: 0  Transfer (Bed to Chair and Back): 0  Total: 0/100        The Barthel ADL Index: Guidelines  1. The index should be used as a record of what a patient does, not as a record of what a patient could do. 2. The main aim is to establish degree of independence from any help, physical or verbal, however minor and for whatever reason. 3. The need for supervision renders the patient not independent.   4. A patient's performance should be established using the best available evidence. Asking the patient, friends/relatives and nurses are the usual sources, but direct observation and common sense are also important. However direct testing is not needed. 5. Usually the patient's performance over the preceding 24-48 hours is important, but occasionally longer periods will be relevant. 6. Middle categories imply that the patient supplies over 50 per cent of the effort. 7. Use of aids to be independent is allowed. Brenton Alberts., Barthel, D.W. ([id number]). Functional evaluation: the Barthel Index. [address] [place] [address] (14)2. TYRESE Rock, Gely Bermeo., Maritza Ruiz., [place], [address] (1999). Measuring the change indisability after inpatient rehabilitation; comparison of the responsiveness of the Barthel Index and Functional [place] Measure. Journal of Neurology, Neurosurgery, and Psychiatry, 66(4), 145-163. Oral KATEY Arteaga.RAZIA, AYANA Fry, & Maximo Reza MLaneyA. (2004.) Assessment of post-stroke quality of life in cost-effectiveness studies: The usefulness of the Barthel Index and the EuroQoL-5D. Quality of Life Research, 15, 152-86     Occupational Therapy Evaluation Charge Determination   History Examination Decision-Making   MEDIUM Complexity : Expanded review of history including physical, cognitive and psychosocial  history  HIGH Complexity : 5 or more performance deficits relating to physical, cognitive , or psychosocial skils that result in activity limitations and / or participation restrictions HIGH Complexity : Patient presents with comorbidities that affect occupational performance.  Signifigant modification of tasks or assistance (eg, physical or verbal) with assessment (s) is necessary to enable patient to complete evaluation       Based on the above components, the patient evaluation is determined to be of the following complexity level: MEDIUM  Pain Rating:  Pt reporting minimal pain    Activity Tolerance:   Poor  Please refer to the flowsheet for vital signs taken during this treatment. After treatment patient left in no apparent distress:    Supine in bed, Call bell within reach, Bed / chair alarm activated and Side rails x 3    COMMUNICATION/EDUCATION:   The patients plan of care was discussed with: Physical therapist and Registered nurse. Home safety education was provided and the patient/caregiver indicated understanding., Patient/family have participated as able in goal setting and plan of care. and Patient/family agree to work toward stated goals and plan of care. This patients plan of care is appropriate for delegation to [facility].     Thank you for this referral.  Stefan Garcia OT  Time Calculation: 20 mins

## 2020-06-18 LAB
ALBUMIN SERPL-MCNC: 2.9 G/DL (ref 3.5–5)
ALBUMIN/GLOB SERPL: 0.8 {RATIO} (ref 1.1–2.2)
ALP SERPL-CCNC: 69 U/L (ref 45–117)
ALT SERPL-CCNC: 18 U/L (ref 12–78)
ANION GAP SERPL CALC-SCNC: 8 MMOL/L (ref 5–15)
AST SERPL-CCNC: 27 U/L (ref 15–37)
BASOPHILS # BLD: 0 K/UL (ref 0–0.1)
BASOPHILS NFR BLD: 0 % (ref 0–1)
BILIRUB SERPL-MCNC: 0.6 MG/DL (ref 0.2–1)
BUN SERPL-MCNC: 16 MG/DL (ref 6–20)
BUN/CREAT SERPL: 24 (ref 12–20)
CALCIUM SERPL-MCNC: 8.1 MG/DL (ref 8.5–10.1)
CHLORIDE SERPL-SCNC: 102 MMOL/L (ref 97–108)
CO2 SERPL-SCNC: 23 MMOL/L (ref 21–32)
CREAT SERPL-MCNC: 0.67 MG/DL (ref 0.55–1.02)
DIFFERENTIAL METHOD BLD: ABNORMAL
EOSINOPHIL # BLD: 0.1 K/UL (ref 0–0.4)
EOSINOPHIL NFR BLD: 2 % (ref 0–7)
ERYTHROCYTE [DISTWIDTH] IN BLOOD BY AUTOMATED COUNT: 12.5 % (ref 11.5–14.5)
GLOBULIN SER CALC-MCNC: 3.8 G/DL (ref 2–4)
GLUCOSE SERPL-MCNC: 91 MG/DL (ref 65–100)
HCT VFR BLD AUTO: 38.6 % (ref 35–47)
HGB BLD-MCNC: 13.5 G/DL (ref 11.5–16)
IMM GRANULOCYTES # BLD AUTO: 0 K/UL (ref 0–0.04)
IMM GRANULOCYTES NFR BLD AUTO: 1 % (ref 0–0.5)
LYMPHOCYTES # BLD: 1.5 K/UL (ref 0.8–3.5)
LYMPHOCYTES NFR BLD: 23 % (ref 12–49)
MCH RBC QN AUTO: 31.5 PG (ref 26–34)
MCHC RBC AUTO-ENTMCNC: 35 G/DL (ref 30–36.5)
MCV RBC AUTO: 90.2 FL (ref 80–99)
MONOCYTES # BLD: 0.9 K/UL (ref 0–1)
MONOCYTES NFR BLD: 14 % (ref 5–13)
NEUTS SEG # BLD: 4 K/UL (ref 1.8–8)
NEUTS SEG NFR BLD: 60 % (ref 32–75)
NRBC # BLD: 0 K/UL (ref 0–0.01)
NRBC BLD-RTO: 0 PER 100 WBC
PLATELET # BLD AUTO: 279 K/UL (ref 150–400)
PMV BLD AUTO: 8.8 FL (ref 8.9–12.9)
POTASSIUM SERPL-SCNC: 3.7 MMOL/L (ref 3.5–5.1)
PROT SERPL-MCNC: 6.7 G/DL (ref 6.4–8.2)
RBC # BLD AUTO: 4.28 M/UL (ref 3.8–5.2)
SARS-COV-2, COV2: NOT DETECTED
SODIUM SERPL-SCNC: 133 MMOL/L (ref 136–145)
SOURCE, COVRS: NORMAL
SPECIMEN SOURCE, FCOV2M: NORMAL
WBC # BLD AUTO: 6.6 K/UL (ref 3.6–11)

## 2020-06-18 PROCEDURE — 74011250636 HC RX REV CODE- 250/636: Performed by: STUDENT IN AN ORGANIZED HEALTH CARE EDUCATION/TRAINING PROGRAM

## 2020-06-18 PROCEDURE — 85025 COMPLETE CBC W/AUTO DIFF WBC: CPT

## 2020-06-18 PROCEDURE — 65660000000 HC RM CCU STEPDOWN

## 2020-06-18 PROCEDURE — 94760 N-INVAS EAR/PLS OXIMETRY 1: CPT

## 2020-06-18 PROCEDURE — 77030038269 HC DRN EXT URIN PURWCK BARD -A

## 2020-06-18 PROCEDURE — 80053 COMPREHEN METABOLIC PANEL: CPT

## 2020-06-18 PROCEDURE — 36415 COLL VENOUS BLD VENIPUNCTURE: CPT

## 2020-06-18 RX ADMIN — HEPARIN SODIUM 5000 UNITS: 5000 INJECTION INTRAVENOUS; SUBCUTANEOUS at 00:50

## 2020-06-18 RX ADMIN — Medication 10 ML: at 05:52

## 2020-06-18 RX ADMIN — Medication 10 ML: at 08:11

## 2020-06-18 RX ADMIN — Medication 10 ML: at 00:51

## 2020-06-18 RX ADMIN — Medication 10 ML: at 17:03

## 2020-06-18 RX ADMIN — HEPARIN SODIUM 5000 UNITS: 5000 INJECTION INTRAVENOUS; SUBCUTANEOUS at 17:03

## 2020-06-18 RX ADMIN — HEPARIN SODIUM 5000 UNITS: 5000 INJECTION INTRAVENOUS; SUBCUTANEOUS at 08:11

## 2020-06-18 NOTE — PROGRESS NOTES
PHYSICAL THERAPY:Talked to nurse who advised holding therapy this afternoon to allow pt to rest.PT will follow in AM.

## 2020-06-18 NOTE — PROGRESS NOTES
Occupational therapy note:  Spoke with RN who reports patient with elevated HR and 6 second run of SVT. RN requesting therapy hold at this time and follow up tomorrow. Radha Wrihgt MS OTR/L

## 2020-06-18 NOTE — PROGRESS NOTES
Problem: Falls - Risk of  Goal: *Absence of Falls  Description: Document Carrie Minium Fall Risk and appropriate interventions in the flowsheet. Outcome: Progressing Towards Goal  Note: Fall Risk Interventions:  Mobility Interventions: Bed/chair exit alarm    Mentation Interventions: Bed/chair exit alarm    Medication Interventions: Evaluate medications/consider consulting pharmacy    Elimination Interventions: Bed/chair exit alarm              Problem: Pressure Injury - Risk of  Goal: *Prevention of pressure injury  Description: Document Nicolas Scale and appropriate interventions in the flowsheet.   Outcome: Progressing Towards Goal  Note: Pressure Injury Interventions:  Sensory Interventions: Assess changes in LOC    Moisture Interventions: Absorbent underpads    Activity Interventions: Assess need for specialty bed    Mobility Interventions: Assess need for specialty bed, Float heels, HOB 30 degrees or less    Nutrition Interventions: Document food/fluid/supplement intake    Friction and Shear Interventions: Apply protective barrier, creams and emollients

## 2020-06-18 NOTE — ROUTINE PROCESS
Bedside shift change report given to [place] (oncoming nurse) by Dasha Rodriguez (offgoing nurse). Report included the following information SBAR, Intake/Output, MAR, Recent Results and Cardiac Rhythm NSR.

## 2020-06-18 NOTE — CONSULTS
Palliative Medicine Consult  Francisco: [phone] ([phone]    Patient Name: Briana Henley  YOB: 1931    Date of Initial Consult: 6/18/2020  Reason for Consult: Care decisions  Requesting Provider: Isma Hernandez DO   Primary Care Physician: Emmie Barron MD     SUMMARY:   Briana Henley is a 80 y.o. with a past history of dementia, osteoporosis who was admitted on 6/16/2020 from home with a diagnosis of hyponatremia, UTI and deconditioning. She's had a gradual decline over the past year. The family is unable to care for her at home any longer. Current medical issues leading to Palliative Medicine involvement include: care decisions. PALLIATIVE DIAGNOSES:   1. Dementia  2. Debility  3. Goals-of-care discussion       PLAN:   1. I called and spoke with patient's daughter/MPOA, Dom Rodriguez to review events leading to hospital stay. 2. Her mother has had a slow decline in functional capacity over the past year with a more acute decline over the past month. 3. She's also had a decline in her cognitive abilities, at times not recognizing her daughter. 4. I reviewed her mother's wishes for care as expressed in her Advance Medical Directive of 2018. 5. She is DNR. She does not have a DDNR and will need prior to hospital discharge. 6. Her daughter will be coming to the hospital tomorrow and can sign then. 7. Her daughter will let bedside RN know when she arrives to have Palliative Medicine team paged for this. 8. Her mother also expressed her wish to have no life-prolonging measures done at end-of-life. 9. In discussion with Gael Nelson today, this includes no feeding tube placement  10. She would want her mother's acute medical issues to be addressed and treated  11. She had been referred to see Dr. Ankit Morgan as outpatient for possible Partkinson's disease.   12. Her daughter expressed her wish to have that assessment done while her mother is in the hospital.  Olayinka Laura understands this may not occur but the appointment can be rescheduled for outpatient follow-up. 14. Initial consult note routed to primary continuity provider and/or primary health care team members  15. Communicated plan of care with: Palliative Kartik HERNÁNDEZ 192 Team     GOALS OF CARE / TREATMENT PREFERENCES:     GOALS OF CARE:  Patient/Health Care Proxy Stated Goals: Other (comment) - treatment of acute, easily reversible medical issues    TREATMENT PREFERENCES:   Code Status: DNR    Advance Care Planning:  [x] [facility] Interdisciplinary Team has updated the ACP Navigator with Rafael and Patient Capacity      Primary Decision Maker (Active): Jarek Bai - [phone]    Secondary Decision Maker: Jeane Dean - [phone]  Advance Care Planning 6/18/2020   Patient's Healthcare Decision Maker is: Named in scanned ACP document   Primary Decision Maker Name -   Confirm Advance Directive Yes, on file             Other Instructions: Other:    As far as possible, the palliative care team has discussed with patient / health care proxy about goals of care / treatment preferences for patient. HISTORY:     History obtained from: daughter, Alexey Rodgers; chart    CHIEF COMPLAINT: weakness    HPI/SUBJECTIVE:    The patient is:   [] Verbal and participatory  [x] Non-participatory due to: dementia    She's had a slow decline over the past year, especially over the past month. The decline is mostly with her mobility but her memory is declining, too. She's had several UTIs recently which were treated with antibiotics. She saw her PCP, Dr. Sebastian Treviño, a few weeks ago who referred her to see Dr. Nascimento Keeling to evaluate for possible Parkinson's. She was getting weaker, not eating or drinking, so her daughter brought her to the ED. Her daughter is no longer able to care for her at home.     Clinical Pain Assessment (nonverbal scale for severity on nonverbal patients):   Clinical Pain Assessment  Severity: 0          Duration: for how long has pt been experiencing pain (e.g., 2 days, 1 month, years)  Frequency: how often pain is an issue (e.g., several times per day, once every few days, constant)     FUNCTIONAL ASSESSMENT:     Palliative Performance Scale (PPS):  PPS: 50       PSYCHOSOCIAL/SPIRITUAL SCREENING:     Palliative IDT has assessed this patient for cultural preferences / practices and a referral made as appropriate to needs (Cultural Services, Patient Advocacy, Ethics, etc.)    Any spiritual / Alevism concerns:  [] Yes /  [x] No    Caregiver Burnout:  [] Yes /  [x] No /  [] No Caregiver Present      Anticipatory grief assessment:   [x] Normal  / [] Maladaptive       ESAS Anxiety: Anxiety: 0    ESAS Depression:          REVIEW OF SYSTEMS:     Positive and pertinent negative findings in ROS are noted above in HPI. The following systems were [x] reviewed / [] unable to be reviewed as noted in HPI  Other findings are noted below. Systems: constitutional, ears/nose/mouth/throat, respiratory, gastrointestinal, genitourinary, musculoskeletal, integumentary, neurologic, psychiatric, endocrine. Positive findings noted below. Modified ESAS Completed by: provider           Pain: 0   Anxiety: 0 Nausea: 0   Anorexia: 0 Dyspnea: 0     Constipation: No              PHYSICAL EXAM:     From RN flowsheet:  Wt Readings from Last 3 Encounters:   06/17/20 120 lb 9.6 oz (54.7 kg)   05/31/20 134 lb (60.8 kg)   10/31/19 132 lb (59.9 kg)     Blood pressure 148/83, pulse 87, temperature 97 °F (36.1 °C), resp. rate 18, height 5' (1.524 m), weight 120 lb 9.6 oz (54.7 kg), SpO2 93 %.     Pain Scale 1: Numeric (0 - 10)  Pain Intensity 1: 0                 Last bowel movement, if known:     Constitutional: frail, appear relaxed  Eyes: pupils equal, anicteric  ENMT: no nasal discharge, moist mucous membranes  Cardiovascular: regular rhythm, no peripheral edema  Respiratory: breathing not labored, symmetric  Gastrointestinal: soft non-tender, +bowel sounds  Musculoskeletal: no deformity, no tenderness to palpation  Skin: warm, dry  Neurologic: alert, knows name, knows she lives with daughter, unable to recall events leading to hospitalizarion, following commands, moving all extremities  Psychiatric: full affect, no hallucinations  Other:       HISTORY:     Principal Problem:    Hyponatremia (6/16/2020)    Active Problems:    Osteoarthritis ()      ACP (advance care planning) (3/22/2018)      Overview: The patient brought in her signed advance directive today (3/22/18). Vitamin D deficiency (7/8/2018)      Hypercholesterolemia (9/5/2019)      Osteopenia of multiple sites (10/31/2019)      Past Medical History:   Diagnosis Date    ACP (advance care planning) 3/22/2018    The patient brought in her signed advance directive today (3/22/18).  Hypercholesterolemia 9/5/2019    Joint pain     Osteoarthritis     Osteopenia of multiple sites 10/31/2019    Osteoporosis     Schatzki's ring     Vitamin D deficiency 7/8/2018      Past Surgical History:   Procedure Laterality Date    COLONOSCOPY N/A 7/5/2018    COLONOSCOPY (LATEX ALLERGY) performed by [facility]azos [facility] AM[facility]UL[facility]    HX COLONOSCOPY      HX GI      Schatzkis ring dilitation    HX GYN      3 vaginal births    HX HEENT      bilateral cataracts    HX OTHER SURGICAL      Schatzki Ring Dilation      Family History   Problem Relation Age of Onset    Hypertension Mother     Stroke Father     Lung Disease Brother     Cancer Brother         lung    Crohn's Disease Daughter     Thyroid Disease Daughter     Diabetes Son       History reviewed, no pertinent family history.   Social History     Tobacco Use    Smoking status: Never Smoker    Smokeless tobacco: Never Used   Substance Use Topics    Alcohol use: No     Allergies   Allergen Reactions    Codeine Nausea Only      Current Facility-Administered Medications   Medication Dose Route Frequency    sodium chloride (NS) flush 5-40 mL  5-40 mL IntraVENous Q8H    sodium chloride (NS) flush 5-40 mL  5-40 mL IntraVENous PRN    acetaminophen (TYLENOL) tablet 650 mg  650 mg Oral Q4H PRN    heparin (porcine) injection 5,000 Units  5,000 Units SubCUTAneous Q8H    prochlorperazine (COMPAZINE) injection 10 mg  10 mg IntraVENous Q6H PRN          LAB AND IMAGING FINDINGS:     Lab Results   Component Value Date/Time    WBC 6.6 06/18/2020 01:32 AM    HGB 13.5 06/18/2020 01:32 AM    PLATELET 490 12/04/0026 01:32 AM     Lab Results   Component Value Date/Time    Sodium 133 (L) 06/18/2020 01:32 AM    Potassium 3.7 06/18/2020 01:32 AM    Chloride 102 06/18/2020 01:32 AM    CO2 23 06/18/2020 01:32 AM    BUN 16 06/18/2020 01:32 AM    Creatinine 0.67 06/18/2020 01:32 AM    Calcium 8.1 (L) 06/18/2020 01:32 AM    Magnesium 2.3 06/16/2020 12:33 PM    Phosphorus 3.3 10/24/2019 01:41 PM      Lab Results   Component Value Date/Time    Alk. phosphatase 69 06/18/2020 01:32 AM    Protein, total 6.7 06/18/2020 01:32 AM    Albumin 2.9 (L) 06/18/2020 01:32 AM    Globulin 3.8 06/18/2020 01:32 AM     No results found for: INR, PTMR, PTP, PT1, PT2, APTT, INREXT, INREXT   No results found for: IRON, FE, TIBC, IBCT, PSAT, FERR   No results found for: PH, PCO2, PO2  No components found for: [place]   Lab Results   Component Value Date/Time     06/16/2020 04:25 PM                Total time:   Counseling / coordination time, spent as noted above:   > 50% counseling / coordination?:     Prolonged service was provided for  []30 min   []75 min in face to face time in the presence of the patient, spent as noted above. Time Start:   Time End:   Note: this can only be billed with 39704 (initial) or 58506 (follow up). If multiple start / stop times, list each separately.

## 2020-06-18 NOTE — PROGRESS NOTES
[address] [facility] [address] [facility] [place]    Office [phone]  Fax [phone] [address] [facility] Residency Attending Addendum:  I saw and evaluated the patient on the day of the encounter with Dr. Emile Carr, performing the gonzalez elements of the service. I discussed the findings, assessment and plan with the resident and agree with the resident's findings and plan as documented in the resident's note. Patient comfortable. Medically stable for discharge. Gail Anderson Pending authorization    Neisha Gabriel MD, TRENT Kyle           Assessment and Plan   Charlesetta Bumpers is a 80 y.o. female who is admitted for hyponatremia, UTI and deconditioning. Known ho HLD.      24 Hour Events: no acute events overnight. Hyponatremia: IMPROVING  (). Pt appears dry and noted to have decreased PO intake per family. Mild symptoms of confusion, nausea, gait disturbance. TSH wnl. FeNA 0.4% indicating pre-renal etiology consistent with volume depletion.   -Strict I&O , encourage PO hydration   -BMP daily   - continue close monitoring of sodium      UTI:RESOLVED-known UTI diagnosed 5/31 in ED, UCx grew Aerococcus - difficult to treat (s/p Amoxiclilin, Macrobid and Bactrim). No urinary complaints but having subjective fever at home to 101 with n/v x2 PTA and worsening fatigue. UA POA wnl.  LA normal. BCX NGx10 hrs. UCx neg. D/c'd CTX   -Compazine for nausea prn   -Tylenol prn for pain and fever   -Daily BMP      Deconditioning 2/2 Dementia: Family noticed progressive decline over past year. Significant decline in the last 3 weeks. Pt can no longer perform ADLS. A&O x1 on admission. Family requests SNF/LTC. Was supposed to follow up with Dr. Elizabet Carvalho (neurology) outpatient for eval for dementia. CT head and CXR without acute process. TSH,  B12 and folte normal.  -CM consult- Plan A SNF for rehab then transition to LTC (referrals placed). Plan B LTC as Pt has Medicaid.    -PT/OT consulted and recommends SNF  -palliative consulted for goals of care.      COVID for Placement NEGATIVE 20- or LTC placement only. Hyperlipidemia: Last lipid panel on 10/2019 and values were (, , HDL 48, ). Unable to calculate ASCVD risk given Pt's age. Lipid panel now , TG 92, HDL 39, LDL 59.  -Hold home Atorvastatin for now given her complaints of weakness.    -Consider stopping at discharge as risks outweigh benefits for patient at this time given her age        FEN/GI -  regular diet. Activity - Ambulate with assistance  DVT prophylaxis - Sub Q Heparin  GI prophylaxis - Not indicated at this time  Fall prophylaxis - Fall precautions ordered. Disposition - Admit to Remote Telemetry. Plan to d/c to SNF then LTC. Consulting PT/OT/CM  Code Status - DNR, discussed with patient / caregivers. Next of Kin Name and Contact Daughter María Stauffer [phone]     Patient Riaz Griffin will be discussed Dr. Eileen Jimenez. I appreciate the opportunity to participate in the care of this patient,  Gigi Arenas,   Family Medicine Resident         Subjective / Objective     Patient resting comfortably. No complaints. Temp (24hrs), Av.6 °F (36.4 °C), Min:97 °F (36.1 °C), Max:98.2 °F (36.8 °C)     Objective:  Vital signs: (most recent): Blood pressure 137/83, pulse 89, temperature 97.5 °F (36.4 °C), resp. rate 16, height 5' (1.524 m), weight 120 lb 9.6 oz (54.7 kg), SpO2 96 %. Respiratory:   O2 Device: Room air   General: No acute distress. Alert but appears fatigued    Head: Normocephalic. Atraumatic. Eyes:              Conjunctiva pink. Sclera white. PERRL. Nose:             Septum midline. Mucosa pink. No drainage. Throat: Mucosa pink. Moist mucous membranes. No tonsillar exudates or erythema. Palate movement equal bilaterally. Neck: Supple. Normal ROM. No stiffness. Respiratory: CTAB. No w/r/r/c.   Cardiovascular: RRR. Normal S1,S2. No m/r/g.  Pulses 2+ throughout. GI: + bowel sounds. Nontender. No rebound tenderness or guarding. Nondistended. Extremities: No LE edema. Distal pulses intact. Musculoskeletal: Normal ROM    Skin: Warm, dry. No rashes. No evidence of any skin break down or wounds. Neuro: A&O x1  (person only). Unable to fully assess CN's 2/2 Pt's poor understanding and lack of cooperation.          I/O:  Date 06/18/20 0700 - 06/19/20 0659 06/19/20 0700 - 06/20/20 0659   Shift 1154-9842 1072-5179 24 Hour Total 1385-7972 9600-1991 24 Hour Total   INTAKE   Shift Total(mL/kg)         OUTPUT   Urine(mL/kg/hr) 350(0.5)  350        Urine Voided 350  350        Urine Occurrence(s)  1 x 1 x      Stool           Stool Occurrence(s)  1 x 1 x      Shift Total(mL/kg) 350(6.4)  350(6.4)      NET -350  -350      Weight (kg) 54.7 54.7 54.7 54.7 54.7 54.7       Inpatient Medications  Current Facility-Administered Medications   Medication Dose Route Frequency    sodium chloride (NS) flush 5-40 mL  5-40 mL IntraVENous Q8H    sodium chloride (NS) flush 5-40 mL  5-40 mL IntraVENous PRN    acetaminophen (TYLENOL) tablet 650 mg  650 mg Oral Q4H PRN    heparin (porcine) injection 5,000 Units  5,000 Units SubCUTAneous Q8H    prochlorperazine (COMPAZINE) injection 10 mg  10 mg IntraVENous Q6H PRN         Allergies  Allergies   Allergen Reactions    Codeine Nausea Only         CBC:  Recent Labs     06/19/20  0420 06/18/20  0132 06/17/20  0148   WBC 7.7 6.6 5.9   HGB 14.6 13.5 12.7   HCT 42.0 38.6 36.7    279 930       Metabolic Panel:  Recent Labs     06/19/20  0420 06/18/20  0132 06/17/20  1339  06/16/20  1233   * 133* 132*   < > 129*   K 3.5 3.7 4.1   < > 4.5    102 101   < > 98   CO2 24 23 23   < > 26   BUN 19 16 15   < > 19   CREA 0.70 0.67 0.70   < > 1.09*   * 91 101*   < > 121*   CA 8.6 8.1* 8.1*   < > 8.8   MG  --   --   --   --  2.3   ALB 3.3* 2.9* 3.2*  --  3.5   ALT 21 18 20  --  23    < > = values in this interval not displayed. Imaging/procedures:    CXR Results  (Last 48 hours)    None        CT Results  (Last 48 hours)    None          . For Billing    Chief Complaint   Patient presents with   [facility] Problems  Date Reviewed: 6/8/2020          Codes Class Noted POA    * (Principal) Hyponatremia ICD-10-CM: E87.1  ICD-9-CM: 276.1  6/16/2020 Unknown        Osteopenia of multiple sites ICD-10-CM: M85.89  ICD-9-CM: 733.90  10/31/2019 Yes        Hypercholesterolemia ICD-10-CM: E78.00  ICD-9-CM: 272.0  9/5/2019 Yes        Vitamin D deficiency ICD-10-CM: E55.9  ICD-9-CM: 268.9  7/8/2018 Yes        ACP (advance care planning) ICD-10-CM: Z71.89  ICD-9-CM: V65.49  3/22/2018 Yes    Overview Signed 3/22/2018  8:40 AM by Hang Pichardo MD     The patient brought in her signed advance directive today (3/22/18).              Osteoarthritis ICD-10-CM: M19.90  ICD-9-CM: 715.90  Unknown Yes

## 2020-06-18 NOTE — PROGRESS NOTES
Spoke with Pt's daughter Sherre Lesches over the phone and provided updates regarding dispo to SNF/LTC. Discussed treatment plan for patient and answered all questions. Informed Sherre Lesches that she can call back at anytime should she have any additional questions. Shana Sahu D.O.    Family Medicine Resident PGY1

## 2020-06-18 NOTE — PROGRESS NOTES
RN notified Family Practice team about 6 second run of SVT. HR elevated in th 150's. Patient asymptomatic. HR returned to baseline. Patient in no apparent distress. Will continue to monitor.

## 2020-06-18 NOTE — PROGRESS NOTES
Bedside and Verbal shift change report given to Katarzyna Sousa RN (oncoming nurse) by Júnior Pradhan RN (offgoing nurse). Report included the following information SBAR, Kardex, MAR, Accordion and Recent Results.

## 2020-06-19 LAB
ALBUMIN SERPL-MCNC: 3.3 G/DL (ref 3.5–5)
ALBUMIN/GLOB SERPL: 0.8 {RATIO} (ref 1.1–2.2)
ALP SERPL-CCNC: 76 U/L (ref 45–117)
ALT SERPL-CCNC: 21 U/L (ref 12–78)
ANION GAP SERPL CALC-SCNC: 7 MMOL/L (ref 5–15)
AST SERPL-CCNC: 25 U/L (ref 15–37)
BASOPHILS # BLD: 0 K/UL (ref 0–0.1)
BASOPHILS NFR BLD: 0 % (ref 0–1)
BILIRUB SERPL-MCNC: 0.5 MG/DL (ref 0.2–1)
BUN SERPL-MCNC: 19 MG/DL (ref 6–20)
BUN/CREAT SERPL: 27 (ref 12–20)
CALCIUM SERPL-MCNC: 8.6 MG/DL (ref 8.5–10.1)
CHLORIDE SERPL-SCNC: 103 MMOL/L (ref 97–108)
CO2 SERPL-SCNC: 24 MMOL/L (ref 21–32)
CREAT SERPL-MCNC: 0.7 MG/DL (ref 0.55–1.02)
DIFFERENTIAL METHOD BLD: ABNORMAL
EOSINOPHIL # BLD: 0.1 K/UL (ref 0–0.4)
EOSINOPHIL NFR BLD: 2 % (ref 0–7)
ERYTHROCYTE [DISTWIDTH] IN BLOOD BY AUTOMATED COUNT: 12.5 % (ref 11.5–14.5)
GLOBULIN SER CALC-MCNC: 3.9 G/DL (ref 2–4)
GLUCOSE SERPL-MCNC: 102 MG/DL (ref 65–100)
HCT VFR BLD AUTO: 42 % (ref 35–47)
HGB BLD-MCNC: 14.6 G/DL (ref 11.5–16)
IMM GRANULOCYTES # BLD AUTO: 0.1 K/UL (ref 0–0.04)
IMM GRANULOCYTES NFR BLD AUTO: 1 % (ref 0–0.5)
LYMPHOCYTES # BLD: 1.7 K/UL (ref 0.8–3.5)
LYMPHOCYTES NFR BLD: 22 % (ref 12–49)
MCH RBC QN AUTO: 31.5 PG (ref 26–34)
MCHC RBC AUTO-ENTMCNC: 34.8 G/DL (ref 30–36.5)
MCV RBC AUTO: 90.7 FL (ref 80–99)
MONOCYTES # BLD: 0.6 K/UL (ref 0–1)
MONOCYTES NFR BLD: 8 % (ref 5–13)
NEUTS SEG # BLD: 5.2 K/UL (ref 1.8–8)
NEUTS SEG NFR BLD: 67 % (ref 32–75)
NRBC # BLD: 0 K/UL (ref 0–0.01)
NRBC BLD-RTO: 0 PER 100 WBC
PLATELET # BLD AUTO: 330 K/UL (ref 150–400)
PMV BLD AUTO: 9.1 FL (ref 8.9–12.9)
POTASSIUM SERPL-SCNC: 3.5 MMOL/L (ref 3.5–5.1)
PROT SERPL-MCNC: 7.2 G/DL (ref 6.4–8.2)
RBC # BLD AUTO: 4.63 M/UL (ref 3.8–5.2)
SODIUM SERPL-SCNC: 134 MMOL/L (ref 136–145)
WBC # BLD AUTO: 7.7 K/UL (ref 3.6–11)

## 2020-06-19 PROCEDURE — 94760 N-INVAS EAR/PLS OXIMETRY 1: CPT

## 2020-06-19 PROCEDURE — 65660000000 HC RM CCU STEPDOWN

## 2020-06-19 PROCEDURE — 74011250636 HC RX REV CODE- 250/636: Performed by: STUDENT IN AN ORGANIZED HEALTH CARE EDUCATION/TRAINING PROGRAM

## 2020-06-19 PROCEDURE — 80053 COMPREHEN METABOLIC PANEL: CPT

## 2020-06-19 PROCEDURE — 65270000029 HC RM PRIVATE

## 2020-06-19 PROCEDURE — 97530 THERAPEUTIC ACTIVITIES: CPT

## 2020-06-19 PROCEDURE — 74011250637 HC RX REV CODE- 250/637: Performed by: STUDENT IN AN ORGANIZED HEALTH CARE EDUCATION/TRAINING PROGRAM

## 2020-06-19 PROCEDURE — 77030038269 HC DRN EXT URIN PURWCK BARD -A

## 2020-06-19 PROCEDURE — 85025 COMPLETE CBC W/AUTO DIFF WBC: CPT

## 2020-06-19 PROCEDURE — 36415 COLL VENOUS BLD VENIPUNCTURE: CPT

## 2020-06-19 RX ADMIN — ACETAMINOPHEN 650 MG: 325 TABLET ORAL at 17:42

## 2020-06-19 RX ADMIN — HEPARIN SODIUM 5000 UNITS: 5000 INJECTION INTRAVENOUS; SUBCUTANEOUS at 08:10

## 2020-06-19 RX ADMIN — Medication 10 ML: at 08:11

## 2020-06-19 RX ADMIN — ACETAMINOPHEN 650 MG: 325 TABLET ORAL at 08:10

## 2020-06-19 RX ADMIN — Medication 5 ML: at 00:21

## 2020-06-19 RX ADMIN — HEPARIN SODIUM 5000 UNITS: 5000 INJECTION INTRAVENOUS; SUBCUTANEOUS at 17:42

## 2020-06-19 RX ADMIN — HEPARIN SODIUM 5000 UNITS: 5000 INJECTION INTRAVENOUS; SUBCUTANEOUS at 01:59

## 2020-06-19 RX ADMIN — Medication 10 ML: at 17:42

## 2020-06-19 NOTE — PROGRESS NOTES
Summary: Attempting to pull out saline lock. Responded well to diversion at this time. Order received to discontinue telemetry. Bathe'd and complete linen change by tech. Tolerated well. Continue to monitor status,maintain comfort, provide emotional support.

## 2020-06-19 NOTE — PROGRESS NOTES
Bedside report given to oncoming RN, Fallon Wilcox. Reviewed plan of care, SBAR, Kardex, Disposition on discharge, and noted improved cognitive status. Opportunity given to ask questions and answered.

## 2020-06-19 NOTE — ACP (ADVANCE CARE PLANNING)
Advance Care Planning (ACP) Physician/NP/PA (Provider) Conversation        Date of ACP Conversation: 6/16/2020    Conversation Conducted with:    Healthcare Decision Maker: Named in Advance Directive or Healthcare Power of  (name) Daughter Leandra Metcalf Decision Maker:    Current Designated Health Care Decision Maker:   Primary Decision Maker (Active): MARIAN Lopez - [phone]    Secondary Decision Maker: Tabitha  - [phone]     Care Preferences:    Code status:  Daughter had made the decision for no resuscitative efforts in regards to her mother, or \"DNR\" status prior to this discussion. I confirmed these wishes during today's discussion. ACP Conversation:   I met with patient's daughter/Sunshine Quiroz. She reiterated her mother's course of decline both physically and cognitively over the past year. Her care needs have progressed to the point of needing both Karthik Guillermina and her  to assist her to the bathroom at the end of May. Since May 31, they have not been able to get her up at all and she has become incontinent of both urine and stool in the bed. This is congruent with our therapy notes here which indicate she is a max assist for bed mobility and only brief stand. Karthik Sarmiento notes a decrease in oral intake over the past several months as well, though she has never had great oral intake (longstanding) and has always had a sensitive stomach. Her mother is no longer able to converse at length and sometimes does not recognize her daughter. Karthik Sarmiento is hopeful for her mother to live out the remainder of her time comfortably and with good nursing care. We both agreed that as the underlying cause of her decline is the dementia, she is unlikely to make juan in a rehab setting. Karthik Sarmiento is already planning for long term care.   We discussed anticipating that her mother is unlikely to eat and drink on a consistent basis moving forward, and therefore remains at risk for recurrent dehydration. In light of her debility she is also at risk for infection, skin breakdown and aspiration. In line with a goal of comfort, Karthik Sarmiento desires no re-hospitalization as acute issues arise. She would quality for Hospice support once she transitions to a long term care bed and Karthik Sarmiento agrees that this support would be helpful. POST form completed reflecting above goals:  - DNR status (DDNR also completed in case persons caring for her are unfamiliar with POST)  - Comfort measures  - No feeding tube    Originals placed in chart to go with her patient to her SNF. Copies provided to daughter Octavio Quiroz. Consult placed to case management for Hospice information session so that this benefit can be utilized once she transitions to LTC, best to occur as soon as possible as again I do not anticipate she will progress forward at rehab. Discussed above plan with care manager Babs Morales.     Length of Voluntary ACP Conversation in minutes:  50 minutes      Griselda Garcia MD

## 2020-06-19 NOTE — PROGRESS NOTES
Nutrition Assessment:    RECOMMENDATIONS/INTERVENTION(S):   1. Continue regular, no concentrated sweets diet. 2. Add Ensure Enlive BID and Magic Cup 1x/d to promote adequate intake. 3. Continue to monitor intakes, wt changes, labs. ASSESSMENT:   6/19: Pt assessed for LOS. Admitted with hyponatremia. PMH includes dementia. BMI 23.5, WNL. Pt noted to have dementia, unable to answer questions appropriately. No family in room at time of visit. Recorded intake of 75% breakfast this AM. Other intakes 0-25%. Recorded weights show 10% wt loss x3 weeks, which is considered significant for time frame. Will add Ensure BID to promote intake. Monitor intakes. Labs- Na 134. Meds reviewed. Diet Order: Regular  % Eaten:    Patient Vitals for the past 72 hrs:   % Diet Eaten   06/19/20 0811 75 %   06/18/20 1755 0 %   06/18/20 1255 10 %   06/18/20 0851 25 %       Pertinent Medications: [x] Reviewed    Labs: [x] Reviewed    Anthropometrics: Height: 5' (152.4 cm) Weight: 54.7 kg (120 lb 9.6 oz)    IBW (%IBW):   ( ) UBW (%UBW):   (  %)      BMI: Body mass index is 23.55 kg/m². This BMI is indicative of:   [] Underweight    [x] Normal    [] Overweight    []  Obesity    []  Extreme Obesity (BMI>40)  Estimated Nutrition Needs (Based on): 1164 Kcals/day(896 x 1.3 AF) , 55 g(1-1.2 g/kg) Protein  Carbohydrate: At Least 130 g/day  Fluids: 1164 mL/day (1 ml/kcal)    Last BM: unknown   []Active     []Hyperactive  []Hypoactive       [] Absent   BS  Skin:    [x] Intact   [] Incision  [] Breakdown   [] DTI   [] Tears/Excoriation/Abrasion  []Edema [] Other:      Wt Readings from Last 30 Encounters:   06/17/20 54.7 kg (120 lb 9.6 oz)   05/31/20 60.8 kg (134 lb)   10/31/19 59.9 kg (132 lb)   09/05/19 59.4 kg (131 lb)   10/11/18 51.3 kg (113 lb)   10/04/18 50.8 kg (112 lb)   10/04/18 50.8 kg (112 lb)   07/05/18 50.6 kg (111 lb 9.6 oz)   05/23/18 52.5 kg (115 lb 12.8 oz)   03/22/18 54 kg (119 lb)   12/14/17 52.2 kg (115 lb)   11/02/17 52.1 kg (114 lb 12.8 oz)      NUTRITION DIAGNOSES:   Problem:  Inadequate oral intake     Etiology: related to decreased ability to consume sufficient po intake     Signs/Symptoms: as evidenced by po intakes <25% meals      NUTRITION INTERVENTIONS:  Meals/Snacks: General/healthful diet   Supplements: Commercial supplement              GOAL:   PO intake >50% meals + ONS next 1-3 days    Cultural, Druze, or Ethnic Dietary Needs: None     EDUCATION & DISCHARGE NEEDS:    [x] None Identified   [] Identified and Education Provided/Documented   [] Identified and Pt declined/was not appropriate      [] Interdisciplinary Care Plan Reviewed/Documented    [x] Discharge Needs: regular diet   [] No Nutrition Related Discharge Needs    NUTRITION RISK:   Pt Is At Nutrition Risk  [x]     No Nutrition Risk Identified  []       PT SEEN FOR:    []  MD Consult: []Calorie Count      []Diabetic Diet Education        []Diet Education     []Electrolyte Management     []General Nutrition Management and Supplements     []Management of Tube Feeding     []TPN Recommendations    []  RN Referral:  []MST score >=2     []Enteral/Parenteral Nutrition PTA     []Pregnant: Gestational DM or Multigestation                 [] Pressure Ulcer    []  Low BMI      [x]  Length of Stay       [] Dysphagia Diet         [] Ventilator  []  Follow-up     Previous Recommendations:   [] Implemented          [] Not Implemented          [x] Not Applicable    Previous Goal:   [] Met              [] Progressing Towards Goal              [] Not Progressing Towards Goal   [x] Not Applicable            Negra Kaiser [address] [facility]  Pager [phone]  Phone [phone]

## 2020-06-19 NOTE — PROGRESS NOTES
Bedside and Verbal shift change report given to Baltazar (oncoming nurse) by Nannette Hodgkins (offgoing nurse).  Report included the following information SBAR, Kardex, Procedure Summary, Intake/Output and MAR. '

## 2020-06-19 NOTE — PROGRESS NOTES
[address] [facility] [address] [place] [address]   Office [phone]  Fax [phone] Residency Attending Addendum:  I saw and evaluated the patient on the day of the encounter with Dr. Maris Murdock, performing the key elements of the service. I discussed the findings, assessment and plan with the resident and agree with the resident's findings and plan as documented in the resident's note. Patient comfortable and stable for discharge from medical standpoint. Ting Ramirez MD, FAAFP, CAQSM, RMSK           Assessment and Plan   Riaz Griffin is a 80 y.o. female who is admitted for hyponatremia, UTI and deconditioning. Known ho HLD.      24 Hour Events: no acute events overnight. Patient is stable for discharge from a medical standpoint     Hyponatremia: RESOLVED  (). Pt appears dry and noted to have decreased PO intake per family. Mild symptoms of confusion, nausea, gait disturbance. TSH wnl. FeNA 0.4% indicating pre-renal etiology consistent with volume depletion.   -Strict I&O , encourage PO hydration   -BMP daily   - continue close monitoring of sodium      UTI:RESOLVED-known UTI diagnosed 5/31 in ED, UCx grew Aerococcus - difficult to treat (s/p Amoxiclilin, Macrobid and Bactrim). No urinary complaints but having subjective fever at home to 101 with n/v x2 PTA and worsening fatigue. UA POA wnl.  LA normal. BCX NGx10 hrs. UCx neg. D/c'd CTX   -Compazine for nausea prn   -Tylenol prn for pain and fever   -Daily BMP      Deconditioning 2/2 Dementia: Family noticed progressive decline over past year. Significant decline in the last 3 weeks. Pt can no longer perform ADLS. A&O x1 on admission. Family requests SNF/LTC. Was supposed to follow up with Dr. aSnford Ruvalcaba (neurology) outpatient for eval for dementia. CT head and CXR without acute process. TSH,  B12 and folte normal.  -CM consult- Plan A SNF for rehab then transition to LTC (referrals placed). Plan B LTC as Pt has Medicaid. -PT/OT consulted and recommends SNF  -palliative consulted for goals of care.      COVID for Placement NEGATIVE 20- for LTC placement only. Hyperlipidemia: Last lipid panel on 10/2019 and values were (, , HDL 48, ). Unable to calculate ASCVD risk given Pt's age. Lipid panel now , TG 92, HDL 39, LDL 59.  -Hold home Atorvastatin for now given her complaints of weakness.    -Consider stopping at discharge as risks outweigh benefits for patient at this time given her age        FEN/GI -  regular diet. Activity - Ambulate with assistance  DVT prophylaxis - Sub Q Heparin  GI prophylaxis - Not indicated at this time  Fall prophylaxis - Fall precautions ordered. Disposition - Admit to Remote Telemetry. Plan to d/c to SNF then LTC. Consulting PT/OT/CM  Code Status - DNR, discussed with patient / caregivers. Next of Kin Name and Contact Daughter Dulce Urrutia [phone]     Patient Dany Arizmendi will be discussed Dr. Marla Walker. I appreciate the opportunity to participate in the care of this patient,  Shana Sahu DO  Family Medicine Resident         Subjective / Objective     Patient resting comfortably. No complaints. Temp (24hrs), Av.4 °F (36.3 °C), Min:97.3 °F (36.3 °C), Max:97.6 °F (36.4 °C)     Objective:  Vital signs: (most recent): Blood pressure 121/61, pulse 91, temperature 97.3 °F (36.3 °C), resp. rate 18, height 5' (1.524 m), weight 120 lb 9.6 oz (54.7 kg), SpO2 98 %. Respiratory:   O2 Device: Room air   General: No acute distress. Alert but appears fatigued    Head: Normocephalic. Atraumatic. Eyes:              Conjunctiva pink. Sclera white. PERRL. Nose:             Septum midline. Mucosa pink. No drainage. Throat: Mucosa pink. Moist mucous membranes. No tonsillar exudates or erythema. Palate movement equal bilaterally. Neck: Supple. Normal ROM. No stiffness. Respiratory: CTAB. No w/r/r/c. Cardiovascular: RRR. Normal S1,S2. No m/r/g. Pulses 2+ throughout. GI: + bowel sounds. Nontender. No rebound tenderness or guarding. Nondistended. Extremities: No LE edema. Distal pulses intact. Musculoskeletal: Normal ROM    Skin: Warm, dry. No rashes. No evidence of any skin break down or wounds. Neuro: A&O x1  (person only). Unable to fully assess CN's 2/2 Pt's poor understanding and lack of cooperation. I/O:  Date 06/19/20 0700 - 06/20/20 0659 06/20/20 0700 - 06/21/20 0659   Shift 1602-9867 7197-2693 24 Hour Total 3558-8585 9362-5216 24 Hour Total   INTAKE   P.O. 240  240        P. O. 240  240      Shift Total(mL/kg) 240(4.4)  240(4.4)      OUTPUT   Urine(mL/kg/hr)           Urine Occurrence(s) 3 x 1 x 4 x      Stool           Stool Occurrence(s) 2 x 1 x 3 x      Shift Total(mL/kg)           240      Weight (kg) 54.7 54.7 54.7 54.7 54.7 54.7       Inpatient Medications  Current Facility-Administered Medications   Medication Dose Route Frequency    sodium chloride (NS) flush 5-40 mL  5-40 mL IntraVENous Q8H    sodium chloride (NS) flush 5-40 mL  5-40 mL IntraVENous PRN    acetaminophen (TYLENOL) tablet 650 mg  650 mg Oral Q4H PRN    heparin (porcine) injection 5,000 Units  5,000 Units SubCUTAneous Q8H    prochlorperazine (COMPAZINE) injection 10 mg  10 mg IntraVENous Q6H PRN         Allergies  Allergies   Allergen Reactions    Codeine Nausea Only         CBC:  Recent Labs     06/19/20 0420 06/18/20  0132   WBC 7.7 6.6   HGB 14.6 13.5   HCT 42.0 38.6    813       Metabolic Panel:  Recent Labs     06/20/20  0012 06/19/20  0420 06/18/20  0132 06/17/20  1339    134* 133* 132*   K 3.6 3.5 3.7 4.1    103 102 101   CO2 23 24 23 23   BUN 26* 19 16 15   CREA 0.70 0.70 0.67 0.70   * 102* 91 101*   CA 8.6 8.6 8.1* 8.1*   ALB  --  3.3* 2.9* 3.2*   ALT  --  21 18 20           Imaging/procedures:    CXR Results  (Last 48 hours)    None        CT Results  (Last 48 hours)    None          . For Billing    Chief Complaint   Patient presents with   [facility] Problems  Date Reviewed: 6/8/2020          Codes Class Noted POA    * (Principal) Hyponatremia ICD-10-CM: E87.1  ICD-9-CM: 276.1  6/16/2020 Unknown        Osteopenia of multiple sites ICD-10-CM: M85.89  ICD-9-CM: 733.90  10/31/2019 Yes        Hypercholesterolemia ICD-10-CM: E78.00  ICD-9-CM: 272.0  9/5/2019 Yes        Vitamin D deficiency ICD-10-CM: E55.9  ICD-9-CM: 268.9  7/8/2018 Yes        ACP (advance care planning) ICD-10-CM: Z71.89  ICD-9-CM: V65.49  3/22/2018 Yes    Overview Signed 3/22/2018  8:40 AM by Dieter Reich MD     The patient brought in her signed advance directive today (3/22/18).              Osteoarthritis ICD-10-CM: M19.90  ICD-9-CM: 715.90  Unknown Yes

## 2020-06-19 NOTE — PROGRESS NOTES
Problem: Self Care Deficits Care Plan (Adult)  Goal: *Acute Goals and Plan of Care (Insert Text)  Description: FUNCTIONAL STATUS PRIOR TO ADMISSION: Since June 1st pt has required A x 2 for transfers and has been non-ambulatory. Per chart, pt requires assistance for all ADLs, however unclear how much assistance. HOME SUPPORT PRIOR TO ADMISSION: The patient lived with daughter and son in law. Occupational Therapy Goals  Initiated 6/17/2020  1. Patient will perform self-feeding with minimal assistance/contact guard assist within 7 day(s). 2.  Patient will perform grooming, in supported sitting, with minimal assistance/contact guard assist within 7 day(s). 3.  Patient will tolerate sitting EOB for 10 minutes with CGA to perform functional task within 7 day(s). 4.  Patient will perform rolling in bed to R/L with mod A to assist with toileting hygiene within 7 day(s). 5.  Patient will perform upper body dressing and bathing, seated, with minimal assistance within 7 days. 6.  Patient will participate in upper extremity therapeutic exercise/activities with minimal assistance/contact guard assist for 5 minutes within 7 day(s). Outcome: Progressing Towards Goal   OCCUPATIONAL THERAPY TREATMENT  Patient: Joe Urias (50 y.o. female)  Date: 6/19/2020  Diagnosis: Hyponatremia [E87.1]   Hyponatremia       Precautions:    Chart, occupational therapy assessment, plan of care, and goals were reviewed. ASSESSMENT  Patient continues with skilled OT services and is progressing towards goals. Pt limited by impaired static sitting balance edge of bed for functional tasks. She has posterior lean and LOB without constant support for ADL's. She needs max assist to roll to right in prep for toileting hygiene.     Current Level of Function Impacting Discharge (ADLs): Dep grooming seated edge of bed, dep toileting    Other factors to consider for discharge:          PLAN :  Patient continues to benefit from skilled intervention to address the above impairments. Continue treatment per established plan of care. to address goals. Recommend with staff: ADL's with bed in chair position, supported sit    Recommend next OT session: Cont towards goals    Recommendation for discharge: (in order for the patient to meet his/her long term goals)  Therapy up to 5 days/week in SNF setting    This discharge recommendation:  Has not yet been discussed the attending provider and/or case management    IF patient discharges home will need the following DME:        SUBJECTIVE:   Patient stated .    OBJECTIVE DATA SUMMARY:   Cognitive/Behavioral Status:  Neurologic State: Confused; Appropriate for age  Orientation Level: Oriented to person;Disoriented to place; Disoriented to situation;Disoriented to time  Cognition: Decreased attention/concentration; Follows commands; Impaired decision making;Poor safety awareness             Functional Mobility and Transfers for ADLs:  Bed Mobility:  Supine to Sit: Maximum assistance;Assist x2  Sit to Supine: Maximum assistance;Assist x2    Transfers:   Not tested          Balance:  Sitting: Impaired; With support(posterior lean)  Sitting - Static: Poor (constant support)  Sitting - Dynamic: Poor (constant support)    ADL Intervention:       Grooming  Grooming Assistance: Total assistance(dependent)(seated edge of bed)  Position Performed: Seated edge of bed  Brushing/Combing Hair: Total assistance (dependent)  Cues: Physical assistance       Activity Tolerance:   Poor  Please refer to the flowsheet for vital signs taken during this treatment.     After treatment patient left in no apparent distress:   Supine in bed    COMMUNICATION/COLLABORATION:   The patients plan of care was discussed with: Physical therapy assistant and Occupational therapist.     MAINE Smart  Time Calculation: 15 mins

## 2020-06-19 NOTE — PROGRESS NOTES
CM Note:  Transition of Care Plan:      RUR-10%              1. Hospital admission for medical management, PT/OT evals, neurology consult  2. CM to follow through for treatment/response  3. covid results sent  4. D/C when stable  Plan A: SNF for rehab, transition to LTC, referral placed in [employer] to [facility], will need [facility]  Plan B: LTC, pt has Medicaid, will need [facility]  5. [facility] has accepted pt  6. Transport TBD will likely need ila Wooten RN

## 2020-06-19 NOTE — PROGRESS NOTES
Problem: Mobility Impaired (Adult and Pediatric)  Goal: *Acute Goals and Plan of Care (Insert Text)  Description: FUNCTIONAL STATUS PRIOR TO ADMISSION: Since June 1st pt has required A x 2 for transfers and has been non-ambulatory. HOME SUPPORT PRIOR TO ADMISSION: The patient lived with daughter and son in law. Physical Therapy Goals  Initiated 6/16/2020  1. Patient will move from supine to sit and sit to supine  in bed with maximal assistance within 7 day(s). 2.  Patient will transfer from bed to chair and chair to bed with maximal assistance using the least restrictive device within 7 day(s). 3.  Patient will perform sit to stand with maximal assistance within 7 day(s). 4.  Patient will ambulate with maximal assistance for 5 feet with the least restrictive device within 7 day(s). Note:   PHYSICAL THERAPY TREATMENT  Patient: Mora Babinski (30 y.o. female)  Date: 6/19/2020  Diagnosis: Hyponatremia [E87.1]   Hyponatremia       Precautions:    Chart, physical therapy assessment, plan of care and goals were reviewed. ASSESSMENT  Patient continues with skilled PT services. Pt agreeable to PT. Pt performed LE exercise with max assist.Pt max to total assist of 2 for supine to sit. Pt sits on EOB with poor sitting balance leaning heavily to posterior max assist.Pt unable to maintain sitting for any extended period of time. Pt returned to supine with max to total assist of 2. Pt progress slow. Continue goals. Current Level of Function Impacting Discharge (mobility/balance): Pt max to total assist of 2 for mobility. PLAN :  Patient continues to benefit from skilled intervention to address the above impairments. Continue treatment per established plan of care. to address goals.     Recommendation for discharge: (in order for the patient to meet his/her long term goals)  Therapy up to 5 days/week in SNF setting    This discharge recommendation:  Has been made in collaboration with the attending provider and/or case management    IF patient discharges home will need the following DME:        SUBJECTIVE:       OBJECTIVE DATA SUMMARY:   Critical Behavior:  Neurologic State: Confused, Appropriate for age  Orientation Level: Oriented to person, Disoriented to place, Disoriented to situation, Disoriented to time  Cognition: Decreased attention/concentration, Follows commands, Impaired decision making, Poor safety awareness  Safety/Judgement: Decreased awareness of environment, Decreased awareness of need for assistance, Decreased awareness of need for safety  Functional Mobility Training:  Bed Mobility:     Supine to Sit: Maximum assistance; Total assistance;Assist x2  Sit to Supine: Maximum assistance; Total assistance;Assist x2          Balance:  Sitting: Impaired  Sitting - Static: Poor (constant support)  Sitting - Dynamic: Poor (constant support)           Activity Tolerance:   Fair  Please refer to the flowsheet for vital signs taken during this treatment.     After treatment patient left in no apparent distress:   Supine in bed    COMMUNICATION/COLLABORATION:   The patients plan of care was discussed with: Physical therapist.     Sherice Martin PTA   Time Calculation: 23 mins

## 2020-06-20 LAB
ANION GAP SERPL CALC-SCNC: 8 MMOL/L (ref 5–15)
BUN SERPL-MCNC: 26 MG/DL (ref 6–20)
BUN/CREAT SERPL: 37 (ref 12–20)
CALCIUM SERPL-MCNC: 8.6 MG/DL (ref 8.5–10.1)
CHLORIDE SERPL-SCNC: 105 MMOL/L (ref 97–108)
CO2 SERPL-SCNC: 23 MMOL/L (ref 21–32)
CREAT SERPL-MCNC: 0.7 MG/DL (ref 0.55–1.02)
GLUCOSE SERPL-MCNC: 115 MG/DL (ref 65–100)
POTASSIUM SERPL-SCNC: 3.6 MMOL/L (ref 3.5–5.1)
SODIUM SERPL-SCNC: 136 MMOL/L (ref 136–145)

## 2020-06-20 PROCEDURE — 80048 BASIC METABOLIC PNL TOTAL CA: CPT

## 2020-06-20 PROCEDURE — 65270000029 HC RM PRIVATE

## 2020-06-20 PROCEDURE — 74011250636 HC RX REV CODE- 250/636: Performed by: STUDENT IN AN ORGANIZED HEALTH CARE EDUCATION/TRAINING PROGRAM

## 2020-06-20 PROCEDURE — 65660000000 HC RM CCU STEPDOWN

## 2020-06-20 PROCEDURE — 77030038269 HC DRN EXT URIN PURWCK BARD -A

## 2020-06-20 PROCEDURE — 36415 COLL VENOUS BLD VENIPUNCTURE: CPT

## 2020-06-20 PROCEDURE — 94760 N-INVAS EAR/PLS OXIMETRY 1: CPT

## 2020-06-20 RX ADMIN — HEPARIN SODIUM 5000 UNITS: 5000 INJECTION INTRAVENOUS; SUBCUTANEOUS at 00:13

## 2020-06-20 RX ADMIN — Medication 10 ML: at 18:12

## 2020-06-20 RX ADMIN — HEPARIN SODIUM 5000 UNITS: 5000 INJECTION INTRAVENOUS; SUBCUTANEOUS at 18:12

## 2020-06-20 RX ADMIN — Medication 10 ML: at 09:38

## 2020-06-20 RX ADMIN — HEPARIN SODIUM 5000 UNITS: 5000 INJECTION INTRAVENOUS; SUBCUTANEOUS at 09:36

## 2020-06-20 RX ADMIN — Medication 10 ML: at 09:40

## 2020-06-20 RX ADMIN — Medication 10 ML: at 00:13

## 2020-06-20 NOTE — PROGRESS NOTES
6/20/2020   12:23 PM  CM spoke w/ pt's Dtr Laura Hope, she wants to d/c pt w/ Hospice services to [facility], pt has been accepted by [facility]. Staffa Leopolda 48 spoke w/ [facility] , they will be able to accept pt on Monday. Pt is on will call w/ [facility]. Dong Jeffers        12:08 PM  Received call back from [facility], she will check w/ on call admissions to determine if Janus Gonzalez is necessary. I await her response  Dong Jeffers    11:47 AM  CM spoke w/ Dr Jesus Moore, pt is stable for d/c today, CM placed TC to [facility] spoke w/ Lucero Oswald RN supervisor [phone] they are unable to admit pt over weekend. Per [employer] pt accepted by [facility]. Plan for d/c to [facility], ? Pending insurance auth   pt on will call for [facility].   Dong Jeffers

## 2020-06-20 NOTE — PROGRESS NOTES
[address] [facility] [address] [address]   Office [phone]  Fax [phone] Residency Attending Addendum:  I saw and evaluated the patient on the day of the encounter with Dr. Joni Low, performing the gonzalez elements of the service. I discussed the findings, assessment and plan with the resident and agree with the resident's findings and plan as documented in the resident's note. Patient stable from medical standpoint to discharge to hospice potentially tomorrow. Zoe Wakefield MD, FAAFP, CAQSM, RMSK           Assessment and Plan   Juany Rutherford is a 80 y.o. female who is admitted for hyponatremia, UTI and deconditioning. Known ho HLD.      24 Hour Events: no acute events overnight. Patient is stable for discharge from a medical standpoint     Hyponatremia: RESOLVED  (). Pt appears dry and noted to have decreased PO intake per family. Mild symptoms of confusion, nausea, gait disturbance. TSH wnl. FeNA 0.4% indicating pre-renal etiology consistent with volume depletion.   -Strict I&O , encourage PO hydration   -BMP every other day   - continue close monitoring of sodium      UTI:RESOLVED-known UTI diagnosed 5/31 in ED, UCx grew Aerococcus - difficult to treat (s/p Amoxiclilin, Macrobid and Bactrim). No urinary complaints but having subjective fever at home to 101 with n/v x2 PTA and worsening fatigue. UA POA wnl.  LA normal. BCX NGx10 hrs. UCx neg. D/c'd CTX   -Compazine for nausea prn   -Tylenol prn for pain and fever      Deconditioning 2/2 Dementia: Family noticed progressive decline over past year. Significant decline in the last 3 weeks. Pt can no longer perform ADLS. A&O x1 on admission. Family requests SNF/LTC. Was supposed to follow up with Dr. Cary James (neurology) outpatient for eval for dementia. CT head and CXR without acute process.  TSH,  B12 and folte normal.  -CM consult- Pt dtr wants to d/c Pt w/ Hospice services to [facility]. Pt has been accepted by [facility] and will be accepted   -PT/OT consulted and recommends SNF  -palliative consulted for goals of care.      COVID for Placement NEGATIVE 20- for LTC placement only. Hyperlipidemia: Last lipid panel on 10/2019 and values were (, , HDL 48, ). Unable to calculate ASCVD risk given Pt's age. Lipid panel now , TG 92, HDL 39, LDL 59.  -Hold home Atorvastatin for now given her complaints of weakness.    -Consider stopping at discharge as risks outweigh benefits for patient at this time given her age        FEN/GI -  regular diet. Activity - Ambulate with assistance  DVT prophylaxis - Sub Q Heparin  GI prophylaxis - Not indicated at this time  Fall prophylaxis - Fall precautions ordered. Disposition - Admit to Remote Telemetry. Plan to d/c to SNF then LTC. Consulting PT/OT/CM  Code Status - DNR, discussed with patient / caregivers. Next of Kin Name and Contact Daughter Jennifer Brewster [phone]     Patient Alanis Jane will be discussed Dr. Boaz Boogie. I appreciate the opportunity to participate in the care of this patient,  Brian Feng DO  Family Medicine Resident         Subjective / Objective     Patient resting comfortably. No complaints. Temp (24hrs), Av.8 °F (36.6 °C), Min:97.1 °F (36.2 °C), Max:98.3 °F (36.8 °C)     Objective:  Vital signs: (most recent): Blood pressure 128/68, pulse 93, temperature 98.3 °F (36.8 °C), resp. rate 16, height 5' (1.524 m), weight 120 lb 9.6 oz (54.7 kg), SpO2 98 %. Respiratory:   O2 Device: Room air   General: No acute distress. Alert but appears fatigued    Head: Normocephalic. Atraumatic. Eyes:              Conjunctiva pink. Sclera white. PERRL. Nose:             Septum midline. Mucosa pink. No drainage. Throat: Mucosa pink. Moist mucous membranes. No tonsillar exudates or erythema. Palate movement equal bilaterally. Neck: Supple. Normal ROM. No stiffness. Respiratory: CTAB. No w/r/r/c.   Cardiovascular: RRR. Normal S1,S2. No m/r/g. Pulses 2+ throughout. GI: + bowel sounds. Nontender. No rebound tenderness or guarding. Nondistended. Extremities: No LE edema. Distal pulses intact. Musculoskeletal: Normal ROM    Skin: Warm, dry. No rashes. No evidence of any skin break down or wounds. Neuro: A&O x1  (person only). Unable to fully assess CN's 2/2 Pt's poor understanding and lack of cooperation. I/O:  Date 06/20/20 0700 - 06/21/20 0659 06/21/20 0700 - 06/22/20 0659   Shift 4132-2277 2386-2040 24 Hour Total 4489-2277 1396-7889 24 Hour Total   INTAKE   P.O. 240  240        P. O. 240  240      Shift Total(mL/kg) 240(4.4)  240(4.4)      OUTPUT   Urine(mL/kg/hr)  150 150        Urine Output (mL) (External Female Catheter 06/18/20)  150 150      Shift Total(mL/kg)  150(2.7) 150(2.7)       -150 90      Weight (kg) 54.7 54.7 54.7 54.7 54.7 54.7       Inpatient Medications  Current Facility-Administered Medications   Medication Dose Route Frequency    sodium chloride (NS) flush 5-40 mL  5-40 mL IntraVENous Q8H    sodium chloride (NS) flush 5-40 mL  5-40 mL IntraVENous PRN    acetaminophen (TYLENOL) tablet 650 mg  650 mg Oral Q4H PRN    heparin (porcine) injection 5,000 Units  5,000 Units SubCUTAneous Q8H    prochlorperazine (COMPAZINE) injection 10 mg  10 mg IntraVENous Q6H PRN         Allergies  Allergies   Allergen Reactions    Codeine Nausea Only         CBC:  Recent Labs     06/19/20  0420   WBC 7.7   HGB 14.6   HCT 42.0          Metabolic Panel:  Recent Labs     06/20/20  0012 06/19/20  0420    134*   K 3.6 3.5    103   CO2 23 24   BUN 26* 19   CREA 0.70 0.70   * 102*   CA 8.6 8.6   ALB  --  3.3*   ALT  --  21           Imaging/procedures:    CXR Results  (Last 48 hours)    None        CT Results  (Last 48 hours)    None          .      For Billing    Chief Complaint   Patient presents with   [facility] Problems  Date Reviewed: 6/8/2020          Codes Class Noted POA    * (Principal) Hyponatremia ICD-10-CM: E87.1  ICD-9-CM: 276.1  6/16/2020 Unknown        Osteopenia of multiple sites ICD-10-CM: M85.89  ICD-9-CM: 733.90  10/31/2019 Yes        Hypercholesterolemia ICD-10-CM: E78.00  ICD-9-CM: 272.0  9/5/2019 Yes        Vitamin D deficiency ICD-10-CM: E55.9  ICD-9-CM: 268.9  7/8/2018 Yes        ACP (advance care planning) ICD-10-CM: Z71.89  ICD-9-CM: V65.49  3/22/2018 Yes    Overview Signed 3/22/2018  8:40 AM by Bing Mariano MD     The patient brought in her signed advance directive today (3/22/18).              Osteoarthritis ICD-10-CM: M19.90  ICD-9-CM: 715.90  Unknown Yes

## 2020-06-20 NOTE — PROGRESS NOTES
Bedside and Verbal shift change report given to Minerva Bruno RN (oncoming nurse) by Ting Kelsey RN (offgoing nurse). Report included the following information SBAR, Kardex and MAR.

## 2020-06-21 LAB
BACTERIA SPEC CULT: NORMAL
SERVICE CMNT-IMP: NORMAL

## 2020-06-21 PROCEDURE — 65660000000 HC RM CCU STEPDOWN

## 2020-06-21 PROCEDURE — 74011250636 HC RX REV CODE- 250/636: Performed by: STUDENT IN AN ORGANIZED HEALTH CARE EDUCATION/TRAINING PROGRAM

## 2020-06-21 PROCEDURE — 94760 N-INVAS EAR/PLS OXIMETRY 1: CPT

## 2020-06-21 PROCEDURE — 65270000029 HC RM PRIVATE

## 2020-06-21 RX ADMIN — Medication 10 ML: at 09:15

## 2020-06-21 RX ADMIN — Medication 10 ML: at 16:37

## 2020-06-21 RX ADMIN — Medication 10 ML: at 01:20

## 2020-06-21 RX ADMIN — HEPARIN SODIUM 5000 UNITS: 5000 INJECTION INTRAVENOUS; SUBCUTANEOUS at 01:20

## 2020-06-21 RX ADMIN — HEPARIN SODIUM 5000 UNITS: 5000 INJECTION INTRAVENOUS; SUBCUTANEOUS at 16:36

## 2020-06-21 RX ADMIN — HEPARIN SODIUM 5000 UNITS: 5000 INJECTION INTRAVENOUS; SUBCUTANEOUS at 09:14

## 2020-06-21 NOTE — DISCHARGE SUMMARY
[address]                                                                                DISCHARGE SUMMARY     Patient: Rebecca Brooks  MRN: [id number]  YOB: 1931  Age: 80 y.o. Date of admission:  6/16/2020  Date of discharge:  6/22/2020  Primary care provider:  Judi Ny MD   Admitting provider:  Vikash Sharpe MD    Discharging provider(s): Boni Slade, [facility] Resident  Dr. Karime Del Rio MD -  Family Medicine Attending      Consultations:  [address] TO PALLIATIVE CARE - PROVIDER    Procedures:  · none    Chief Complaint:   · weakness    Discharge destination: Jaspreet Yoder with hospice. The patient is stable for discharge. Admission diagnosis:  Hyponatremia [E87.1]    HPI:   Rebecca Brooks is a 80 y.o. female with known PMH of dementia, OA here with her daughter for generalized weakness and lethargy.  Pt has had increased weakness and fatigue for the past 3 weeks.   Fernando Cruz was diagnosed with UTI in the ED on 5/31 and tried on 3 antibiotics (amoxicillin, macrobid, and then Bactrim DS).  Now she is unable to take antibiotics 2/2 to vomiting x2 today. Baljinder Coronado daughter says she hasn't eaten since yesterday morning either and has barely been able to tolerate liquids today. She has had subjective fever to 101 max 2 days ago and a temp to 100.4 today. Pt unable to verbalize any c/o pain at this time. All history per daughter. [facility] other known complaints at this time.       Of note: Dtr states that it is becoming harder to take of patient at home. She is no longer able to perform any ADL's and transferring Pt is very difficult. She and her  have requested dispo to long term care.          COVID Screening Questions:   Experiencing any of the following symptoms: fever, chills, cough, SOB, diarrhea, URI symptoms.   Fever   Any Sick contacts with fever, cough, diarrhea, SOB, URI symptoms. No  Traveled out of state or out of country. 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 No       Final discharge diagnoses and brief hospital course:   Hyponatremia: RESOLVED  ().  Pt appears dry and noted to have decreased PO intake per family. Mild symptoms of confusion, nausea, gait disturbance.  TSH wnl.  FeNA 0.4% indicating pre-renal etiology consistent with volume depletion.   -Encourage PO hydration     UTI:RESOLVED-known UTI diagnosed 5/31 in ED, UCx grew Aerococcus - difficult to treat (s/p Amoxiclilin, Macrobid and Bactrim).  No urinary complaints but having subjective fever at home to 101 with n/v x2 PTA and worsening fatigue.  UA POA wnl.  LA normal. BCX NGx10 hrs. UCx neg. D/c'd CTX      Deconditioning 2/2 Dementia: Family noticed progressive decline over past year.  Significant decline in the last 3 weeks. Pt can no longer perform ADLS.  A&O x1 on admission.  Family requests SNF/LTC.  Was supposed to follow up with Dr. Karan Myers (neurology) outpatient for eval for dementia.  CT head and CXR without acute process. TSH,  B12 and folte normal.  -Hospice to follow     COVID for Placement NEGATIVE 6/16/20- for LTC placement only.       Hyperlipidemia: Last lipid panel on 10/2019 and values were (, , HDL 48, ).   Unable to calculate ASCVD risk given Pt's age.  Lipid panel now , TG 92, HDL 39, LDL 59.  -Stop home Atorvastatin for now given her complaints of weakness.        Follow-up Cares:   · Hospice will follow    Follow-up Information     Follow up With Specialties Details Why [facility] [address]    [address]  [phone]              Physical Examination at Discharge:     Visit Vitals  /88 (BP 1 Location: Right arm, BP Patient Position: At rest)   Pulse 73   Temp 98.1 °F (36.7 °C)   Resp 16   Ht 5' (1.524 m)   Wt 121 lb (54.9 kg)   SpO2 94%   BMI 23.63 kg/m²       GEN: Patient is alert and oriented x3. NAD  HEENT:  EOMI  Heart: RRR, S1 S2 noted. No M/R/G. Lungs: clear to auscultation and percussion throughout both lung fields  CV:normal  Abdomen soft  Extremeties:no clubbing, cyanosis, edema  Neuro alert, oriented x 3  Skin:  warm       Pertinent Imaging Studies:     Xr Chest Pa Lat    Result Date: 5/31/2020  INDICATION: Weakness COMPARISON: October 4, 2018 FINDINGS: PA and lateral views of the chest demonstrate a stable cardiomediastinal silhouette and clear lungs bilaterally. The visualized osseous structures are unremarkable. IMPRESSION: No acute process. No significant change from the prior study. Ct Head Wo Cont    Result Date: 6/16/2020  EXAM: CT HEAD WO CONT INDICATION: lethargy; weakness; COMPARISON: 5/31/2020. CONTRAST: None. TECHNIQUE: Unenhanced CT of the head was performed using 5 mm images. Brain and bone windows were generated. Coronal and sagittal reformats. CT dose reduction was achieved through use of a standardized protocol tailored for this examination and automatic exposure control for dose modulation. FINDINGS: Generalized atrophy is again noted. Small vessel ischemic changes are stable compared to the prior exam. There is no intracranial hemorrhage, extra-axial collection, or mass effect. The basilar cisterns are open. No CT evidence of acute infarct. The bone windows demonstrate no abnormalities. The visualized portions of the paranasal sinuses and mastoid air cells are clear. Vascular calcification is noted. IMPRESSION: No acute process or change compared to the prior exam.     Ct Head Wo Cont    Result Date: 5/31/2020  EXAM: CT HEAD WO CONT INDICATION: AMS, increased confusion COMPARISON: None. CONTRAST: None. TECHNIQUE: Unenhanced CT of the head was performed using 5 mm images. Brain and bone windows were generated.  Coronal and sagittal reformats. CT dose reduction was achieved through use of a standardized protocol tailored for this examination and automatic exposure control for dose modulation. FINDINGS: The ventricles and sulci are appropriate in size, shape and configuration. There is severe periventricular white matter disease. There is no intracranial hemorrhage, extra-axial collection, or mass effect. The basilar cisterns are open. No CT evidence of acute infarct. The bone windows demonstrate no abnormalities. The visualized portions of the paranasal sinuses and mastoid air cells are clear. IMPRESSION: No acute infarct, intracranial hemorrhage, or intracranial mass. Severe periventricular white matter disease is compatible with chronic small vessel ischemia. Xr Chest Port    Result Date: 6/16/2020  Indication: Lethargy Comparison: 5/31/2020 Portable exam of the chest obtained at 1131 demonstrates normal heart size. There is no acute process in the lung fields. The osseous structures are unremarkable. Impression: No acute process. ---------------------------------    Discharge Medications:      Current Discharge Medication List      CONTINUE these medications which have NOT CHANGED    Details   calcium-cholecalciferol, d3, (CALCIUM 600 + D) 600-125 mg-unit tab Take 1 Tab by mouth two (2) times a day.     Associated Diagnoses: Osteopenia of multiple sites         STOP taking these medications       vit C/E/Zn/coppr/lutein/zeaxan (PRESERVISION AREDS-2 PO) Comments:   Reason for Stopping:         pravastatin (PRAVACHOL) 40 mg tablet Comments:   Reason for Stopping:         trimethoprim-sulfamethoxazole (Bactrim DS) 160-800 mg per tablet Comments:   Reason for Stopping:         Cholecalciferol, Vitamin D3, (VITAMIN D3) 2,000 unit cap capsule Comments:   Reason for Stopping:         denosumab (PROLIA) 60 mg/mL injection Comments:   Reason for Stopping:               Chronic Diagnoses:    Problem List as of 6/22/2020 Date Reviewed: 6/8/2020          Codes Class Noted - Resolved    * (Principal) Hyponatremia ICD-10-CM: E87.1  ICD-9-CM: 276.1  6/16/2020 - Present        Osteopenia of multiple sites ICD-10-CM: M85.89  ICD-9-CM: 733.90  10/31/2019 - Present        Hypercholesterolemia ICD-10-CM: E78.00  ICD-9-CM: 272.0  9/5/2019 - Present        Vitamin D deficiency ICD-10-CM: E55.9  ICD-9-CM: 268.9  7/8/2018 - Present        ACP (advance care planning) ICD-10-CM: Z71.89  ICD-9-CM: V65.49  3/22/2018 - Present    Overview Signed 3/22/2018  8:40 AM by Babak Casas MD     The patient brought in her signed advance directive today (3/22/18). Schatzki's ring ICD-10-CM: K22.2  ICD-9-CM: 750.3  Unknown - Present        Osteoarthritis ICD-10-CM: M19.90  ICD-9-CM: 715.90  Unknown - Present        Age-related osteoporosis without current pathological fracture ICD-10-CM: M81.0  ICD-9-CM: 733.01  11/2/2017 - Present              Signed:      Venkatesh Beltran DO   Family Medicine Resident      6/22/2020   10:09 AM     Cc: Babak Casas MD     Encounter Diagnoses     ICD-10-CM ICD-9-CM   1. Lethargy R53.83 780.79   2. Hyponatremia E87.1 276.1   3. Dementia without behavioral disturbance, unspecified dementia type ([address] F03.90 294.20   4. Debility R53.81 799.3   5. Severe dementia ([address] F03.90 294.20   6. Counseling regarding advance care planning and goals of care Z71.89 V65.49     [facility] Medicine Residency Attending Addendum:  I saw and evaluated the patient on the day of the encounter with Dr. Lundy Mohs, performing the key elements of the service. I discussed the findings, assessment and plan with the resident and agree with the resident's findings and plan as documented in the resident's note. Comfortable at SD. Appreciate Palliative assistance.      Arpit Giron MD, FAAFP, CAQSM, RMSK

## 2020-06-21 NOTE — PROGRESS NOTES
Bedside and Verbal shift change report given to Jazmine Terrell RN (oncoming nurse) by Brisa Dailey RN (offgoing nurse). Report included the following information SBAR, Kardex and MAR.

## 2020-06-22 VITALS
HEIGHT: 60 IN | BODY MASS INDEX: 23.4 KG/M2 | RESPIRATION RATE: 16 BRPM | DIASTOLIC BLOOD PRESSURE: 66 MMHG | TEMPERATURE: 97.3 F | OXYGEN SATURATION: 95 % | HEART RATE: 89 BPM | WEIGHT: 119.2 LBS | SYSTOLIC BLOOD PRESSURE: 127 MMHG

## 2020-06-22 PROCEDURE — 74011250636 HC RX REV CODE- 250/636: Performed by: STUDENT IN AN ORGANIZED HEALTH CARE EDUCATION/TRAINING PROGRAM

## 2020-06-22 PROCEDURE — 94760 N-INVAS EAR/PLS OXIMETRY 1: CPT

## 2020-06-22 RX ADMIN — Medication 10 ML: at 09:07

## 2020-06-22 RX ADMIN — HEPARIN SODIUM 5000 UNITS: 5000 INJECTION INTRAVENOUS; SUBCUTANEOUS at 00:30

## 2020-06-22 RX ADMIN — HEPARIN SODIUM 5000 UNITS: 5000 INJECTION INTRAVENOUS; SUBCUTANEOUS at 09:06

## 2020-06-22 NOTE — DISCHARGE INSTRUCTIONS
[facility] / Neeta Coelho / [id number] : 1931    Admission date: 2020 Discharge date: 2020       Primary care provider: Farzaneh Woods MD    Discharging provider:  Becky Calderon, [address] [facility] Resident  Dr. Saloni Pineda - Attending, Family Medicine   . . . . . . . . . . . . . . . . . . . . . . . . . . . . . . . . . . . . . . . . . . . . . . . . . . . . . . . . . . . . . . . . . . . . . . . [facility] FINAL DIAGNOSES & HOSPITAL COURSE:    Per Admitting provider, Sadia Mcmanus is a 80 y. o. female with known PMH of dementia, OA here with her daughter for generalized weakness and lethargy.  Pt has had increased weakness and fatigue for the past 3 weeks.    She was diagnosed with UTI in the ED on  and tried on 3 antibiotics (amoxicillin, macrobid, and then Bactrim DS).  Now she is unable to take antibiotics 2/2 to vomiting x2 today.  Her daughter says she hasn't eaten since yesterday morning either and has barely been able to tolerate liquids today. She has had subjective fever to 101 max 2 days ago and a temp to 100.4 today.  Pt unable to verbalize any c/o pain at this time.  All history per daughter. Maisha Shanell other known complaints at this time.       Of note: Dtr states that it is becoming harder to take of patient at home. Gissell Justin is no longer able to perform any ADL's and transferring Pt is very difficult.  She and her  have requested dispo to long term care.          COVID Screening Questions:   Experiencing any of the following symptoms: fever, chills, cough, SOB, diarrhea, URI symptoms.  Fever   Any Sick contacts with fever, cough, diarrhea, SOB, URI symptoms. No  Traveled out of state or out of country. No   Works in health care or high risk environment. No       Hospital Course by problem  Hyponatremia: RESOLVED  ().  Pt appears dry and noted to have decreased PO intake per family.  Mild symptoms of confusion, nausea, gait disturbance.  TSH wnl.  FeNA 0.4% indicating pre-renal etiology consistent with volume depletion.   -Encourage PO hydration     UTI:RESOLVED-known UTI diagnosed 5/31 in ED, UCx grew Aerococcus - difficult to treat (s/p Amoxiclilin, Macrobid and Bactrim).  No urinary complaints but having subjective fever at home to 101 with n/v x2 PTA and worsening fatigue.  UA POA wnl.  LA normal. BCX NGx10 hrs. UCx neg. D/c'd CTX      Deconditioning 2/2 Dementia: Family noticed progressive decline over past year.  Significant decline in the last 3 weeks. Pt can no longer perform ADLS.  A&O x1 on admission.  Family requests SNF/LTC.  Was supposed to follow up with Dr. Karie Correa (neurology) outpatient for eval for dementia.  CT head and CXR without acute process. TSH,  B12 and folte normal.  -Hospice to follow     COVID for Placement NEGATIVE 6/16/20- for LTC placement only.       Hyperlipidemia: Last lipid panel on 10/2019 and values were (, , HDL 48, ).  Unable to calculate ASCVD risk given Pt's age.  Lipid panel now , TG 92, HDL 39, LDL 59.  -Stop home Atorvastatin for now given her complaints of weakness. FOLLOW-UP CARE RECOMMENDATIONS:  Follow-up Information     Follow up With Specialties Details Why [facility] [address] [address]  [phone]    [address] [address]  [phone]              It is very important that you keep follow-up appointment(s). Bring discharge papers, medication list (and/or medication bottles) to follow-up appointments for review by outpatient provider(s)  FOLLOW-UP TESTS RECOMMENDED:   · none    ONGOING TREATMENT PLAN: hospice care    PENDING TEST RESULTS:  At the time of discharge the following test results are still pending: none. Please review these results as they become available.     Specific symptoms to watch for: chest pain, shortness of breath, fever, chills, nausea, vomiting, diarrhea, change in mentation, falling, weakness, bleeding. DIET:  Mechanical soft, ensure with breakfast, magic cup at dinner    ACTIVITY:  Activity as tolerated    WOUND CARE: none     EQUIPMENT needed:  none    INCIDENTAL FINDINGS:  ***    GOALS OF CARE:    Eventual return to home/independent/assisted living     Long term SNF    x  Hospice     No rehospitalization     Patient condition at discharge:   Functional status    Poor    x  Deconditioned      Independent   Cognition    Lucid     Forgetful (some sensescence)   x  Dementia   Catheters/lines (plus indication)    Bernstein     PICC      PEG         Code status    Full code    x  DNR    . . . . . . . . . . . . . . . . . . . . . . . . . . . . . . . . . . . . . . . . . . . . . . . . . . . . . . . . . . . . . . . . . . . . . . . Courtney Diaz CHRONIC MEDICAL CONDITIONS:  Problem List as of 6/22/2020 Date Reviewed: 6/8/2020          Codes Class Noted - Resolved    * (Principal) Hyponatremia ICD-10-CM: E87.1  ICD-9-CM: 276.1  6/16/2020 - Present        Osteopenia of multiple sites ICD-10-CM: M85.89  ICD-9-CM: 733.90  10/31/2019 - Present        Hypercholesterolemia ICD-10-CM: E78.00  ICD-9-CM: 272.0  9/5/2019 - Present        Vitamin D deficiency ICD-10-CM: E55.9  ICD-9-CM: 268.9  7/8/2018 - Present        ACP (advance care planning) ICD-10-CM: Z71.89  ICD-9-CM: V65.49  3/22/2018 - Present    Overview Signed 3/22/2018  8:40 AM by Dante Middleton MD     The patient brought in her signed advance directive today (3/22/18).              Schatzki's ring ICD-10-CM: K22.2  ICD-9-CM: 750.3  Unknown - Present        Osteoarthritis ICD-10-CM: M19.90  ICD-9-CM: 715.90  Unknown - Present        Age-related osteoporosis without current pathological fracture ICD-10-CM: M81.0  ICD-9-CM: 733.01  11/2/2017 - Present              Information obtained by :   I understand that if any problems occur once I am at home I am to contact my physician. I understand and acknowledge receipt of the instructions indicated above.                                                                                                                                              Physician's or R.N.'s Signature                                                                  Date/Time                                                                                                                                              Patient or Representative Signature                                                          Date/Time

## 2020-06-22 NOTE — PROGRESS NOTES
Discharge order noted. Plan for patient to go to facility this afternoon by [facility]. Patient's daughter Karthik Sarmiento at bedside. Karthik Sarmiento given discharge update. 1218: Patient's daughter and Dawna Zhang, is no longer at bedside. Ms. Hitesh Hunt called, discharge instructions reviewed with patient's POA over the phone. Ms. Hernández Host understanding. Copy of discharge instructions included in patient's belongings.

## 2020-06-22 NOTE — PROGRESS NOTES
CMS Note  6/22/2020    Patient received their 2nd IMM letter, patient was not able to sign the letter for CMS. Patient was given a copy for their record.   Fabricio Alvarez CMS

## 2020-06-22 NOTE — PROGRESS NOTES
2125    Pt pulled out peripheral IV sometime around shift change. Per [facility], okay for pt to be without IV access overnight. 1119    Bedside and Verbal shift change report given to Deyanira Rojas RN (oncoming nurse) by Prince Moore RN (offgoing nurse). Report included the following information SBAR, Kardex and MAR.

## 2020-06-22 NOTE — PROGRESS NOTES
CM Note:  Transition of Care Plan to SNF/Rehab    Communication to Patient/Family:  Met with patient and family and they are agreeable to the transition plan. The Plan for Transition of Care is related to the following treatment goals: The Patient and/or patient representative was provided with a choice of provider and agrees  with the discharge plan. Yes [x] No []    A Freedom of choice list was provided with basic dialogue that supports the patient's individualized plan of care/goals and shares the quality data associated with the providers. Yes [x] No []    SNF/Rehab Transition:  Patient has been accepted to [facility] SNF/Rehab and meets criteria for admission. Patient will transported by [employer] stretcher and expected to leave at 2:30. Communication to SNF/Rehab:  Bedside RN, Rocky Melendrez, has been notified to update the transition plan to the facility and call report ([phone]). Discharge information has been updated on the AVS. And communicated to facility via [employer], or CC link. Discharge instructions to be fax'd to facility at ([phone]). Nursing Please include all hard scripts for controlled substances, med rec and dc summary, and AVS in packet. Reviewed and confirmed with facility, [facility], can manage the patient care needs for the following:     Gudelia Calderon with (X) only those applicable:  Medication:  [x]Medications are available at the facility  []IV Antibiotics    []Controlled Substance  hard copies available sent.   []Weekly Labs    Equipment:  []CPAP/BiPAP  []Wound Vacuum  []Bernstein or Urinary Device  []PICC/Central Line  []Nebulizer  []Ventilator    Treatment:  []Isolation (for MRSA, VRE, etc.)  []Surgical Drain Management  []Tracheostomy Care  []Dressing Changes  []Dialysis with transportation  []PEG Care  []Oxygen  []Daily Weights for Heart Failure    Dietary:  []Any diet limitations  []Tube Feedings   []Total Parenteral Management (TPN)    Financial Resources:  []Medicaid Application Completed    []UAI Completed and copy given to pt/family  and copy given to pt/family  []A screening has previously been completed. []Level II Completed    [] Private pay individual who will not become   financially eligible for Medicaid within 6 months from admission to a [address] [place] [address] facility. [] Individual refused to have screening conducted. []Medicaid Application Completed    []The screening denied because it was determined individual did not need/did not qualify for nursing facility level of care. [] Out of state residents seeking direct admission to a [address] facility. [] Individuals who are inpatients of an out of state hospital, or in state or out of state veterans/ hospital and seek direct admission to a [address] facility  [] Individuals who are pateints or residents of a state owned/operated facility that is licensed by [employer] of [employer] ([employer]) and seek direct admission to [address] [place] [address]  [] A screening not required for enrollment in [address] [address] [address] services as set out in [address] [place] [address] 82-  [] [facility]) staff shall perform screenings of the [facility] clients. Advanced Care Plan:  []Surrogate Decision Maker of Care  []POA  []Communicated Code Status and copy sent. Other:   Patient to admit to [place] [address]. on admission    L. Adine Cowden

## 2020-06-22 NOTE — PALLIATIVE CARE DISCHARGE
Goals of Care/Treatment Preferences The Palliative Medicine team was consulted as part of your/your loved one's care in the hospital. Our team is a supportive service; we strive to relieve suffering and improve quality of life. We reviewed advance care planning information, which includes the following: 
Patient's Healthcare Decision Maker is[de-identified] Named in scanned ACP document Confirm Advance Directive: Yes, on file Patient/Health Care Proxy Stated Goals: Comfort We reviewed / discussed your code status as: DNR 
   Full Code means perform CPR in the event of cardiac arrest. 
    DNR means do NOT perform CPR in the event of cardiac arrest. 
    Partial Code means you have specific preferences, please discuss with your healthcare team. 
    David Hashimoto means this issue was not addressed / resolved during your stay Medical Interventions: Comfort measures Other Instructions: You have a Durable Do Not Resuscitate Order in place, which should travel with you. When you are in a facility, this form should be placed on your chart. Once you are home, it is recommended that the [facility] form be placed in a visible location such as on the refrigerator or bedroom door. Artificially Administered Nutrition: No feeding tube Because of the importance of this information, we are providing you with a printed copy to share with other healthcare providers after this hospitalization is complete.

## 2020-07-09 ENCOUNTER — APPOINTMENT (OUTPATIENT)
Dept: INFUSION THERAPY | Age: 85
End: 2020-07-09

## 2020-08-04 DIAGNOSIS — E78.00 HYPERCHOLESTEROLEMIA: ICD-10-CM

## 2020-08-04 RX ORDER — PRAVASTATIN SODIUM 40 MG/1
TABLET ORAL
Qty: 90 TAB | Refills: 3 | Status: SHIPPED | OUTPATIENT
Start: 2020-08-04

## 2022-02-03 NOTE — PROGRESS NOTES
Patient has graduated from the Transitions of Care Coordination  program on 8/6/18. Patient's symptoms are stable at this time. Patient/family has the ability to self-manage. Care management goals have been completed at this time. No further nurse navigator follow up scheduled. Pt has nurse navigator's contact information for any further questions, concerns, or needs.   Patients upcoming visits:  Future Appointments  Date Time Provider Hi Alberts   12/27/2018 2:00 PM [facility] CHAIR 1 ALFIE [place] Intermediate / Complex Repair - Final Wound Length In Cm: 3.6

## 2022-03-18 PROBLEM — E87.1 HYPONATREMIA: Status: ACTIVE | Noted: 2020-06-16

## 2022-03-18 PROBLEM — M81.0 AGE-RELATED OSTEOPOROSIS WITHOUT CURRENT PATHOLOGICAL FRACTURE: Status: ACTIVE | Noted: 2017-11-02

## 2022-03-19 PROBLEM — E55.9 VITAMIN D DEFICIENCY: Status: ACTIVE | Noted: 2018-07-08

## 2022-03-19 PROBLEM — E78.00 HYPERCHOLESTEROLEMIA: Status: ACTIVE | Noted: 2019-09-05

## 2022-03-19 PROBLEM — M85.89 OSTEOPENIA OF MULTIPLE SITES: Status: ACTIVE | Noted: 2019-10-31

## 2022-03-20 PROBLEM — Z71.89 ACP (ADVANCE CARE PLANNING): Status: ACTIVE | Noted: 2018-03-22

## 2023-05-24 RX ORDER — PRAVASTATIN SODIUM 40 MG
1 TABLET ORAL NIGHTLY
COMMUNITY
Start: 2020-08-04

## (undated) DEVICE — (D)SYR 10ML 1/5ML GRAD NSAF -- PKGING CHANGE USE ITEM 338027

## (undated) DEVICE — BASIN EMSIS 16OZ GRAPHITE PLAS KID SHP MOLD GRAD FOR ORAL

## (undated) DEVICE — FORCEPS BX L240CM JAW DIA2.8MM L CAP W/ NDL MIC MESH TOOTH

## (undated) DEVICE — TRAP SUC MUCOUS 70ML -- MEDICHOICE MEDLINE

## (undated) DEVICE — SOLIDIFIER MEDC 1200ML -- CONVERT TO 356117

## (undated) DEVICE — SNARE ENDOSCP M L240CM W27MM SHTH DIA2.4MM CHN 2.8MM OVL

## (undated) DEVICE — CONTINU-FLO SOLUTION SET, 2 INJECTION SITES, MALE LUER LOCK ADAPTER WITH RETRACTABLE COLLAR, LARGE BORE STOPCOCK WITH ROTATING MALE LUER LOCK EXTENSION SET, 2 INJECTION SITES, MALE LUER LOCK ADAPTER WITH RETRACTABLE COLLAR: Brand: INTERLINK/CONTINU-FLO

## (undated) DEVICE — ENDO CARRY-ON PROCEDURE KIT INCLUDES ENZYMATIC SPONGE, GAUZE, BIOHAZARD LABEL, TRAY, LUBRICANT, DIRTY SCOPE LABEL, WATER LABEL, TRAY, DRAWSTRING PAD, AND DEFENDO 4-PIECE KIT.: Brand: ENDO CARRY-ON PROCEDURE KIT

## (undated) DEVICE — SOLUTION LACTATED RINGERS INJECTION USP

## (undated) DEVICE — 1200 GUARD II KIT W/5MM TUBE W/O VAC TUBE: Brand: GUARDIAN

## (undated) DEVICE — KENDALL DL ECG CABLE AND LEAD WIRE SYSTEM, 3-LEAD, SINGLE PATIENT USE: Brand: KENDALL